# Patient Record
Sex: FEMALE | Race: BLACK OR AFRICAN AMERICAN | Employment: FULL TIME | URBAN - METROPOLITAN AREA
[De-identification: names, ages, dates, MRNs, and addresses within clinical notes are randomized per-mention and may not be internally consistent; named-entity substitution may affect disease eponyms.]

---

## 2017-01-06 ENCOUNTER — TELEPHONE (OUTPATIENT)
Dept: RHEUMATOLOGY | Age: 47
End: 2017-01-06

## 2017-01-06 NOTE — TELEPHONE ENCOUNTER
Called pharmacy and informed them that the patient needs labs prior to refilling of Methotrexate. Also called patient and informed her that labs were need before Methotrexate could be filled. The patient will be getting labs at her employer and will have the results faxed to Dr. Emerita Ch. The patient will also call on 1/9/17 to schedule f/u appt.

## 2017-01-16 RX ORDER — LEVONORGESTREL AND ETHINYL ESTRADIOL 0.15-0.03
KIT ORAL
Qty: 91 TAB | Refills: 2 | Status: ON HOLD | OUTPATIENT
Start: 2017-01-16 | End: 2021-06-11

## 2017-01-30 DIAGNOSIS — Z79.60 LONG-TERM USE OF IMMUNOSUPPRESSANT MEDICATION: ICD-10-CM

## 2017-01-30 DIAGNOSIS — M05.79 SEROPOSITIVE RHEUMATOID ARTHRITIS OF MULTIPLE SITES (HCC): Primary | ICD-10-CM

## 2017-02-07 ENCOUNTER — TELEPHONE (OUTPATIENT)
Dept: RHEUMATOLOGY | Age: 47
End: 2017-02-07

## 2017-03-02 ENCOUNTER — OFFICE VISIT (OUTPATIENT)
Dept: RHEUMATOLOGY | Age: 47
End: 2017-03-02

## 2017-03-02 VITALS
SYSTOLIC BLOOD PRESSURE: 126 MMHG | RESPIRATION RATE: 16 BRPM | HEART RATE: 106 BPM | WEIGHT: 293 LBS | TEMPERATURE: 97.1 F | DIASTOLIC BLOOD PRESSURE: 81 MMHG | BODY MASS INDEX: 50.02 KG/M2 | HEIGHT: 64 IN | OXYGEN SATURATION: 96 %

## 2017-03-02 DIAGNOSIS — R11.11 NON-INTRACTABLE VOMITING WITHOUT NAUSEA, UNSPECIFIED VOMITING TYPE: ICD-10-CM

## 2017-03-02 DIAGNOSIS — Z79.60 LONG-TERM USE OF IMMUNOSUPPRESSANT MEDICATION: ICD-10-CM

## 2017-03-02 DIAGNOSIS — M05.79 SEROPOSITIVE RHEUMATOID ARTHRITIS OF MULTIPLE SITES (HCC): Primary | ICD-10-CM

## 2017-03-02 RX ORDER — METHOTREXATE 2.5 MG/1
TABLET ORAL
Qty: 104 TAB | Refills: 0 | Status: SHIPPED | OUTPATIENT
Start: 2017-03-02 | End: 2017-03-03 | Stop reason: SDUPTHER

## 2017-03-02 RX ORDER — FOLIC ACID 1 MG/1
TABLET ORAL
Qty: 170 TAB | Refills: 3 | Status: SHIPPED | OUTPATIENT
Start: 2017-03-02 | End: 2017-03-03 | Stop reason: SDUPTHER

## 2017-03-02 RX ORDER — PREDNISONE 5 MG/1
TABLET ORAL
Qty: 60 TAB | Refills: 0 | Status: SHIPPED | OUTPATIENT
Start: 2017-03-02 | End: 2017-03-03 | Stop reason: SDUPTHER

## 2017-03-02 RX ORDER — HYDROXYCHLOROQUINE SULFATE 200 MG/1
200 TABLET, FILM COATED ORAL 2 TIMES DAILY
Qty: 180 TAB | Refills: 0 | Status: SHIPPED | OUTPATIENT
Start: 2017-03-02 | End: 2017-03-03 | Stop reason: SDUPTHER

## 2017-03-02 NOTE — PATIENT INSTRUCTIONS
Have labs again in 6-8 weeks to follow up on the methotrexate. Take prednisone 5 mg tablets starting at 2 pills each morning with breakfast and lower by 1/2 tablet each week:  2 pills x 1 week,  Then 1 and 1/2 pills x 1 week,  Then 1 pill x 1 week,  Then 1/2 pill x 1 week, then stop (there will be leftover pills in the bottle)    Have Plaquenil eye exam with your eye doctor today. Review the vomiting with your PCP.

## 2017-03-02 NOTE — PROGRESS NOTES
HISTORY OF PRESENT ILLNESS  Brennan Ahumada is a 40 y.o. female. HPI  She presents for follow up of seropositive rheumatoid arthritis. She was last seen 12/2015. \"I've been doing good\". She had been taking methotrexate 20 mg qwk and Plaquenil 200 mg bid; she ran out of these medicines 1 month ago. She states that since she ran out of her meds, her hands have become stiff, increased over the last week. Morning joint stiffness x 3 hours. She has some swelling in the hands in the mornings now. She has some slight pain in bilateral toes. She notes increased gelling after rest in her knees. No sicca sxs. No rash. No fevers. No Raynaud's sxs. No major illness, hospitalization, or procedures since last visit. No major flares since last visit (until now) and has not had any steroids since last visit. 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 Due to her work schedule, she does not have an exercise routine. She has not seen her PCP in over a year, to follow her diabetes (HgbA1C 6.2 last checked 12/2015), HTN, and vitamin D deficiency. She takes vitamin D 5000 units daily. She will schedule soon with PCP. She has appt today with eye doctor for diabetic and plaquenil eye exam. She rarely takes aleve at bedtime, but has needed it twice in the last week. Review of Systems   Constitutional: Negative for fever and weight loss. HENT: Negative for sore throat. Eyes: Negative for blurred vision. Respiratory: Negative for cough and shortness of breath. Cardiovascular: Negative for leg swelling. Gastrointestinal: Positive for nausea and vomiting. Negative for abdominal pain, blood in stool and melena. She has noted a few episodes of vomiting over the last  2 months, it wakes her up at night. No abdominal pain, no nausea.  She wasn't sure if related to methotrexate at first, however she had a couple episodes in the last month while off of methotrexate, so she feels it is not related. She will f/u with PCP about this. Musculoskeletal: Negative for falls. Skin: Negative for rash. Neurological: Negative for headaches. Endo/Heme/Allergies: Negative for polydipsia. Does not bruise/bleed easily. Allergies   Allergen Reactions    Other Food Hives and Shortness of Breath     Peanuts    Nuts [Tree Nut] Hives and Shortness of Breath    Aspirin Hives    Darvocet A500 [Propoxyphene N-Acetaminophen] Other (comments)     migraine    Imitrex [Sumatriptan Succinate] Palpitations    Sulfa (Sulfonamide Antibiotics) Hives       Current Outpatient Prescriptions   Medication Sig Dispense Refill    levonorgestrel-ethinyl estradiol (SEASONALE) 0.15 mg-30 mcg 3MPk TAKE 1 TABLET BY MOUTH EVERY DAY 91 Tab 2    hydroxychloroquine (PLAQUENIL) 200 mg tablet Take 1 Tab by mouth two (2) times a day. 180 Tab 0    lisinopril (PRINIVIL, ZESTRIL) 20 mg tablet TAKE 1 TABLET BY MOUTH EVERY DAY 30 Tab 5    linagliptin (TRADJENTA) 5 mg tablet Take 1 Tab by mouth daily. 90 Tab 3    metFORMIN (GLUCOPHAGE) 500 mg tablet TAKE 2 TABLETS BY MOUTH TWICE A DAY WITH MEALS 360 Tab 3    lisinopril (PRINIVIL, ZESTRIL) 20 mg tablet TAKE 1 TABLET BY MOUTH EVERY DAY 90 Tab 3    buPROPion XL (WELLBUTRIN XL) 300 mg XL tablet Take 1 Tab by mouth every morning. 90 Tab 3    oxybutynin chloride XL (DITROPAN XL) 10 mg CR tablet Take 1 Tab by mouth daily. 90 Tab 3    folic acid (FOLVITE) 1 mg tablet 2 mg daily on Tuesdays through Fridays; 3 mg daily on Saturdays through Mondays 170 Tab 3    methotrexate (RHEUMATREX) 2.5 mg tablet 20 mg (8 tablets) once weekly. 104 Tab 1    ondansetron (ZOFRAN ODT) 4 mg disintegrating tablet PLACE 1 TABLET ON TONGUE & LET DISSOLVE EVERY 8 HOURS AS NEEDED FOR NAUSEA 20 Tab 2    naproxen sodium (ALEVE) 220 mg tablet Take 660 mg by mouth two (2) times daily (with meals).  CHOLECALCIFEROL, VITAMIN D3, PO Take 5,000 Units by mouth daily.       EPIPEN 2-GRAYSON 0.3 mg/0.3 mL injection INJECT . 3ML BY INTRAMUSCULAR ROUTE ONCE AS NEEDED FOR UP TO 1 DOSE 2 mL 1    TURMERIC, BULK, Take  by mouth. Past medical, surgical, and family hx reviewed. Vitals:    03/02/17 0914   BP: 126/81   Pulse: (!) 106   Resp: 16   Temp: 97.1 °F (36.2 °C)   TempSrc: Oral   SpO2: 96%   Weight: 294 lb (133.4 kg)   Height: 5' 4\" (1.626 m)   PainSc:   6   PainLoc: Hand   LMP: 12/02/2016       Physical Exam   Constitutional: She is oriented to person, place, and time. She appears well-developed and well-nourished. HENT:   Mouth/Throat: Oropharynx is clear and moist.   Eyes: Conjunctivae are normal. Pupils are equal, round, and reactive to light. Neck: Neck supple. Cardiovascular: Regular rhythm and normal heart sounds. Tachycardia present. Pulmonary/Chest: Effort normal and breath sounds normal. She has no wheezes. Abdominal: Soft. Bowel sounds are normal. There is no tenderness. Musculoskeletal:   -synovitis: right hand MCP 2,3 and PIP 2-4; left hand MCP 2,3; both feet MTP 1  -comfortable FROM of shoulders and knees   Lymphadenopathy:     She has no cervical adenopathy. Neurological: She is alert and oriented to person, place, and time. Skin: Skin is warm and dry. No rash noted. Psychiatric: She has a normal mood and affect. Thought content normal.   Vitals reviewed.       Lab Results   Component Value Date/Time    Sed rate (ESR) 29 12/10/2015 09:06 AM     Lab Results   Component Value Date/Time    WBC 9.1 12/10/2015 09:06 AM    HGB 12.8 12/10/2015 09:06 AM    HCT 39.6 12/10/2015 09:06 AM    PLATELET 166 64/90/8811 09:06 AM    MCV 90 12/10/2015 09:06 AM     Lab Results   Component Value Date/Time    Sodium 137 12/10/2015 09:06 AM    Potassium 4.2 12/10/2015 09:06 AM    Chloride 99 12/10/2015 09:06 AM    CO2 22 12/10/2015 09:06 AM    Anion gap 8 03/10/2009 08:22 AM    Glucose 121 12/10/2015 09:06 AM    BUN 9 12/10/2015 09:06 AM    Creatinine 0.67 12/10/2015 09:06 AM    BUN/Creatinine ratio 13 12/10/2015 09:06 AM    GFR est  12/10/2015 09:06 AM    GFR est non- 12/10/2015 09:06 AM    Calcium 9.3 12/10/2015 09:06 AM    Bilirubin, total 0.2 12/10/2015 09:06 AM    AST (SGOT) 14 12/10/2015 09:06 AM    Alk. phosphatase 52 12/10/2015 09:06 AM    Protein, total 7.0 12/10/2015 09:06 AM    Albumin 4.2 12/10/2015 09:06 AM    Globulin 3.7 03/10/2009 08:22 AM    A-G Ratio 1.5 12/10/2015 09:06 AM    ALT (SGPT) 18 12/10/2015 09:06 AM     CDAI 3/2/2017   Swollen Joint Count 7   Tender Joint Count 7   Physician Assessment (0-10) 2   Patient Assessment (0-10) 2   CDAI 18         ASSESSMENT and PLAN    ICD-10-CM ICD-9-CM    1. Seropositive rheumatoid arthritis of multiple sites ([facility] [address] -  Strongly seropositive (RF and CCP) symmetric polyarthritis. She has overall been doing well with methotrexate 20 mg weekly and Plaquenil 200 mg BID. Now off of her regimen x 1 month she has mild synovitis in hands and feet today. M05.79 714.0 SED RATE (ESR)    Resume therapy with:  Plaquenil 200 mg bid  methotrexate 20 mg qwk, with folic acid 3 mg on Sat-Mon and 2 mg the other days          C REACTIVE PROTEIN, QT        hydroxychloroquine (PLAQUENIL) 200 mg tablet      folic acid (FOLVITE) 1 mg tablet      methotrexate (RHEUMATREX) 2.5 mg tablet        predniSONE (DELTASONE) 5 mg tablet - 10 mg taper by 2.5 mg qwk     2. Long-term use of immunosuppressant medication Z79.899 V58.69 CBC WITH AUTOMATED DIFF      METABOLIC PANEL, COMPREHENSIVE    Plaquenil eye exam     3. Non-intractable vomiting without nausea, unspecified vomiting type - intermittent vomiting over the last 2 months, wakes her up at night, no pain or nausea, does not correlate with methotrexate dosing  R11.11 787.03 Follow up with PCP for further evaluation       Follow-up Disposition:  Return in about 4 months (around 7/2/2017).      I have reviewed and discussed all findings with Dr. Hermine Dakin, who also examined the patient, and he agrees with the assessment and plan. ADDENDUM: I have seen and examined the patient. I have discussed the relevant findings with Ms. Zhong and concur with the assessment and plan. Seropositive RA on methotrexate 20 mg weekly and Plaquenil 200 mg BID. Off past month (no visit in over one year). Had been doing well with recent flare. Labs today. Restart medications. Eye doctor soon. Prednisone taper low dose as directed over 4 weeks. Unclear etiology of sporadic night time vomiting. She will review with Dr. Megan Manning.  F/U as noted,  Cassandra Quintanilla MD

## 2017-03-02 NOTE — MR AVS SNAPSHOT
Visit Information Date & Time Provider Department Dept. Phone Encounter #  
 3/2/2017  9:00 AM Chan Rowell MD [facility] [id number] Follow-up Instructions Return in about 4 months (around 7/2/2017). Upcoming Health Maintenance Date Due  
 EYE EXAM RETINAL OR DILATED Q1 11/16/1981 Pneumococcal 19-64 Medium Risk (1 of 1 - PPSV23) 11/16/1990 HEMOGLOBIN A1C Q6M 6/10/2016 INFLUENZA AGE 9 TO ADULT 8/1/2016 FOOT EXAM Q1 12/10/2016 MICROALBUMIN Q1 12/10/2016 LIPID PANEL Q1 12/10/2016 PAP AKA CERVICAL CYTOLOGY 12/10/2018 DTaP/Tdap/Td series (2 - Td) 7/13/2025 Allergies as of 3/2/2017  Review Complete On: 3/2/2017 By: Siva Avina PA-C Severity Noted Reaction Type Reactions Other Food High 03/03/2015    Hives, Shortness of Breath Peanuts Nuts [Tree Nut] High 03/03/2015    Hives, Shortness of Breath Aspirin  10/27/2014    Hives Darvocet A500 [Propoxyphene N-acetaminophen]  10/27/2014    Other (comments)  
 migraine Imitrex [Sumatriptan Succinate]  10/27/2014    Palpitations Sulfa (Sulfonamide Antibiotics)  10/27/2014    Hives Current Immunizations  Reviewed on 3/2/2017 Name Date Influenza Vaccine 9/1/2016, 10/5/2015 Tdap 7/13/2015 Reviewed by Danielle Russell LPN on 8/2/3986 at  8:34 AM  
 Reviewed by Siva Avina PA-C on 3/2/2017 at  9:28 AM  
You Were Diagnosed With   
  
 Codes Comments Seropositive rheumatoid arthritis of multiple sites [facility])    -  Primary ICD-10-CM: M05.79 ICD-9-CM: 714.0 Long-term use of immunosuppressant medication     ICD-10-CM: Z79.899 ICD-9-CM: V58.69 Non-intractable vomiting without nausea, unspecified vomiting type     ICD-10-CM: R11.11 ICD-9-CM: 787.03 Vitals BP  
  
  
  
  
  
 126/81 (BP 1 Location: Right arm, BP Patient Position: Sitting) Vitals History BMI and BSA Data Body Mass Index Body Surface Area 50.46 kg/m 2 2.45 m 2 Preferred Pharmacy Pharmacy Name Phone [facility]ACY #[id number]- [place], [address] [phone] Your Updated Medication List  
  
   
This list is accurate as of: 3/2/17 10:07 AM.  Always use your most recent med list.  
  
  
  
  
 ALEVE 220 mg tablet Generic drug:  naproxen sodium Take 660 mg by mouth two (2) times daily (with meals). buPROPion  mg XL tablet Commonly known as:  Scheryl Churches Take 1 Tab by mouth every morning. CHOLECALCIFEROL (VITAMIN D3) PO Take 5,000 Units by mouth daily. EPIPEN 2-GRAYSON 0.3 mg/0.3 mL injection Generic drug:  EPINEPHrine INJECT . 3ML BY INTRAMUSCULAR ROUTE ONCE AS NEEDED FOR UP TO 1 DOSE  
  
 folic acid 1 mg tablet Commonly known as:  FOLVITE  
2 mg daily on Tuesdays through Fridays; 3 mg daily on Saturdays through Mondays  
  
 hydroxychloroquine 200 mg tablet Commonly known as:  PLAQUENIL Take 1 Tab by mouth two (2) times a day. levonorgestrel-ethinyl estradiol 0.15 mg-30 mcg 3mpk Commonly known as:  SEASONALE  
TAKE 1 TABLET BY MOUTH EVERY DAY  
  
 linagliptin 5 mg tablet Commonly known as:  [place] Take 1 Tab by mouth daily. * lisinopril 20 mg tablet Commonly known as:  PRINIVIL, ZESTRIL  
TAKE 1 TABLET BY MOUTH EVERY DAY  
  
 * lisinopril 20 mg tablet Commonly known as:  PRINIVIL, ZESTRIL  
TAKE 1 TABLET BY MOUTH EVERY DAY  
  
 metFORMIN 500 mg tablet Commonly known as:  GLUCOPHAGE  
TAKE 2 TABLETS BY MOUTH TWICE A DAY WITH MEALS  
  
 methotrexate 2.5 mg tablet Commonly known as:  RHEUMATREX  
20 mg (8 tablets) once weekly. ondansetron 4 mg disintegrating tablet Commonly known as:  ZOFRAN ODT PLACE 1 TABLET ON TONGUE & LET DISSOLVE EVERY 8 HOURS AS NEEDED FOR NAUSEA  
  
 oxybutynin chloride XL 10 mg CR tablet Commonly known as:  DITROPAN XL  
 Take 1 Tab by mouth daily. predniSONE 5 mg tablet Commonly known as:  Berle Pouch Take 2 tabs po qam and lower as directed. TURMERIC (BULK) Take  by mouth. * Notice: This list has 2 medication(s) that are the same as other medications prescribed for you. Read the directions carefully, and ask your doctor or other care provider to review them with you. Prescriptions Sent to Pharmacy Refills  
 hydroxychloroquine (PLAQUENIL) 200 mg tablet 0 Sig: Take 1 Tab by mouth two (2) times a day. Class: Normal  
 Pharmacy: [facility]/pharmacy #[id number] Ph #: [phone] Route: Oral  
 folic acid (FOLVITE) 1 mg tablet 3 Si mg daily on  through ; 3 mg daily on  through  Class: Normal  
 Pharmacy: [facility]/pharmacy #[id number] Ph #: [phone]  
 methotrexate (RHEUMATREX) 2.5 mg tablet 0 Si mg (8 tablets) once weekly. Class: Normal  
 Pharmacy: [facility]/pharmacy #[id number] Ph #: [phone]  
 predniSONE (DELTASONE) 5 mg tablet 0 Sig: Take 2 tabs po qam and lower as directed. Class: Normal  
 [id number] [address] Ph #: [phone] We Performed the Following C REACTIVE PROTEIN, QT [60213 CPT(R)] CBC WITH AUTOMATED DIFF [82365 CPT(R)] METABOLIC PANEL, COMPREHENSIVE [18554 CPT(R)] SED RATE (ESR) [id number] CPT(R)] Follow-up Instructions Return in about 4 months (around 2017). Patient Instructions Have labs again in 6-8 weeks to follow up on the methotrexate. Take prednisone 5 mg tablets starting at 2 pills each morning with breakfast and lower by 1/2 tablet each week: 
2 pills x 1 week, Then 1 and 1/2 pills x 1 week, Then 1 pill x 1 week, 
 Then 1/2 pill x 1 week, then stop (there will be leftover pills in the bottle) Have Plaquenil eye exam with your eye doctor today. Review the vomiting with your PCP. Introducing [facility] & [facility]! Dear [facility]: Thank you for requesting a [employer] account. Our records indicate that you already have an active [employer] account. You can access your account anytime at [email or web address] Did you know that you can access your hospital and ER discharge instructions at any time in [employer]? You can also review all of your test results from your hospital stay or ER visit. Additional Information If you have questions, please visit the Frequently Asked Questions section of the [employer] website at [email or web address] Remember, [employer] is NOT to be used for urgent needs. For medical emergencies, dial 911. Now available from your iPhone and Android! Please provide this summary of care documentation to your next provider. Your primary care clinician is listed as Johnnie Simons. If you have any questions after today's visit, please call [phone].

## 2017-03-03 DIAGNOSIS — M05.79 SEROPOSITIVE RHEUMATOID ARTHRITIS OF MULTIPLE SITES (HCC): ICD-10-CM

## 2017-03-03 LAB
ALBUMIN SERPL-MCNC: 4.1 G/DL (ref 3.5–5.5)
ALBUMIN/GLOB SERPL: 1.4 {RATIO} (ref 1.1–2.5)
ALP SERPL-CCNC: 60 IU/L (ref 39–117)
ALT SERPL-CCNC: 21 IU/L (ref 0–32)
AST SERPL-CCNC: 16 IU/L (ref 0–40)
BASOPHILS # BLD AUTO: 0 X10E3/UL (ref 0–0.2)
BASOPHILS NFR BLD AUTO: 0 %
BILIRUB SERPL-MCNC: 0.2 MG/DL (ref 0–1.2)
BUN SERPL-MCNC: 10 MG/DL (ref 6–24)
BUN/CREAT SERPL: 13 (ref 9–23)
CALCIUM SERPL-MCNC: 9.1 MG/DL (ref 8.7–10.2)
CHLORIDE SERPL-SCNC: 98 MMOL/L (ref 96–106)
CO2 SERPL-SCNC: 22 MMOL/L (ref 18–29)
CREAT SERPL-MCNC: 0.76 MG/DL (ref 0.57–1)
CRP SERPL-MCNC: 17.3 MG/L (ref 0–4.9)
EOSINOPHIL # BLD AUTO: 0.1 X10E3/UL (ref 0–0.4)
EOSINOPHIL NFR BLD AUTO: 1 %
ERYTHROCYTE [DISTWIDTH] IN BLOOD BY AUTOMATED COUNT: 13.4 % (ref 12.3–15.4)
ERYTHROCYTE [SEDIMENTATION RATE] IN BLOOD BY WESTERGREN METHOD: 22 MM/HR (ref 0–32)
GLOBULIN SER CALC-MCNC: 3 G/DL (ref 1.5–4.5)
GLUCOSE SERPL-MCNC: 226 MG/DL (ref 65–99)
HCT VFR BLD AUTO: 41.7 % (ref 34–46.6)
HGB BLD-MCNC: 13.6 G/DL (ref 11.1–15.9)
IMM GRANULOCYTES # BLD: 0 X10E3/UL (ref 0–0.1)
IMM GRANULOCYTES NFR BLD: 0 %
LYMPHOCYTES # BLD AUTO: 2.1 X10E3/UL (ref 0.7–3.1)
LYMPHOCYTES NFR BLD AUTO: 24 %
MCH RBC QN AUTO: 29.5 PG (ref 26.6–33)
MCHC RBC AUTO-ENTMCNC: 32.6 G/DL (ref 31.5–35.7)
MCV RBC AUTO: 91 FL (ref 79–97)
MONOCYTES # BLD AUTO: 0.7 X10E3/UL (ref 0.1–0.9)
MONOCYTES NFR BLD AUTO: 7 %
NEUTROPHILS # BLD AUTO: 6.2 X10E3/UL (ref 1.4–7)
NEUTROPHILS NFR BLD AUTO: 68 %
PLATELET # BLD AUTO: 319 X10E3/UL (ref 150–379)
POTASSIUM SERPL-SCNC: 4.6 MMOL/L (ref 3.5–5.2)
PROT SERPL-MCNC: 7.1 G/DL (ref 6–8.5)
RBC # BLD AUTO: 4.61 X10E6/UL (ref 3.77–5.28)
SODIUM SERPL-SCNC: 139 MMOL/L (ref 134–144)
WBC # BLD AUTO: 9.1 X10E3/UL (ref 3.4–10.8)

## 2017-03-03 RX ORDER — HYDROXYCHLOROQUINE SULFATE 200 MG/1
200 TABLET, FILM COATED ORAL 2 TIMES DAILY
Qty: 180 TAB | Refills: 0 | Status: SHIPPED | OUTPATIENT
Start: 2017-03-03 | End: 2017-10-16 | Stop reason: SDUPTHER

## 2017-03-03 RX ORDER — METHOTREXATE 2.5 MG/1
TABLET ORAL
Qty: 104 TAB | Refills: 0 | Status: SHIPPED | OUTPATIENT
Start: 2017-03-03 | End: 2017-11-21 | Stop reason: SDUPTHER

## 2017-03-03 RX ORDER — PREDNISONE 5 MG/1
TABLET ORAL
Qty: 60 TAB | Refills: 0 | Status: SHIPPED | OUTPATIENT
Start: 2017-03-03 | End: 2017-04-20 | Stop reason: SDUPTHER

## 2017-03-03 RX ORDER — FOLIC ACID 1 MG/1
TABLET ORAL
Qty: 170 TAB | Refills: 3 | Status: SHIPPED | OUTPATIENT
Start: 2017-03-03 | End: 2017-11-21 | Stop reason: SDUPTHER

## 2017-03-16 NOTE — PROGRESS NOTES
She is informed of lab results per Dr. Candiss Merlin note and understands. She does not check blood sugars at home but is following up with her doctor.

## 2017-04-20 DIAGNOSIS — M05.79 SEROPOSITIVE RHEUMATOID ARTHRITIS OF MULTIPLE SITES (HCC): ICD-10-CM

## 2017-04-20 RX ORDER — PREDNISONE 5 MG/1
TABLET ORAL
Qty: 45 TAB | Refills: 0 | Status: SHIPPED | OUTPATIENT
Start: 2017-04-20 | End: 2017-08-15 | Stop reason: ALTCHOICE

## 2017-06-12 RX ORDER — LISINOPRIL 20 MG/1
TABLET ORAL
Qty: 90 TAB | Refills: 2 | Status: SHIPPED | OUTPATIENT
Start: 2017-06-12 | End: 2017-08-15 | Stop reason: SDUPTHER

## 2017-08-15 ENCOUNTER — OFFICE VISIT (OUTPATIENT)
Dept: FAMILY MEDICINE CLINIC | Age: 47
End: 2017-08-15

## 2017-08-15 VITALS
HEIGHT: 64 IN | TEMPERATURE: 98 F | DIASTOLIC BLOOD PRESSURE: 86 MMHG | OXYGEN SATURATION: 97 % | WEIGHT: 293 LBS | BODY MASS INDEX: 50.02 KG/M2 | HEART RATE: 95 BPM | RESPIRATION RATE: 20 BRPM | SYSTOLIC BLOOD PRESSURE: 119 MMHG

## 2017-08-15 DIAGNOSIS — Z00.00 ROUTINE GENERAL MEDICAL EXAMINATION AT A HEALTH CARE FACILITY: Primary | ICD-10-CM

## 2017-08-15 DIAGNOSIS — N32.81 OAB (OVERACTIVE BLADDER): ICD-10-CM

## 2017-08-15 DIAGNOSIS — I10 ESSENTIAL HYPERTENSION: ICD-10-CM

## 2017-08-15 DIAGNOSIS — M05.79 SEROPOSITIVE RHEUMATOID ARTHRITIS OF MULTIPLE SITES (HCC): ICD-10-CM

## 2017-08-15 DIAGNOSIS — E11.9 TYPE 2 DIABETES MELLITUS WITHOUT COMPLICATION, WITHOUT LONG-TERM CURRENT USE OF INSULIN (HCC): ICD-10-CM

## 2017-08-15 RX ORDER — BUPROPION HYDROCHLORIDE 300 MG/1
300 TABLET ORAL
Qty: 90 TAB | Refills: 3 | Status: SHIPPED | OUTPATIENT
Start: 2017-08-15 | End: 2018-07-30 | Stop reason: SDUPTHER

## 2017-08-15 RX ORDER — METFORMIN HYDROCHLORIDE 500 MG/1
TABLET ORAL
Qty: 360 TAB | Refills: 3 | Status: SHIPPED | OUTPATIENT
Start: 2017-08-15 | End: 2018-07-30 | Stop reason: SDUPTHER

## 2017-08-15 RX ORDER — ONDANSETRON 4 MG/1
TABLET, ORALLY DISINTEGRATING ORAL
Qty: 20 TAB | Refills: 2 | Status: SHIPPED | OUTPATIENT
Start: 2017-08-15 | End: 2018-05-01 | Stop reason: SDUPTHER

## 2017-08-15 RX ORDER — LISINOPRIL 20 MG/1
TABLET ORAL
Qty: 90 TAB | Refills: 3 | Status: SHIPPED | OUTPATIENT
Start: 2017-08-15 | End: 2018-10-12 | Stop reason: SDUPTHER

## 2017-08-15 RX ORDER — EPINEPHRINE 0.3 MG/.3ML
INJECTION SUBCUTANEOUS
Qty: 2 ML | Refills: 1 | Status: SHIPPED | OUTPATIENT
Start: 2017-08-15 | End: 2018-04-12 | Stop reason: SDUPTHER

## 2017-08-15 RX ORDER — OXYBUTYNIN CHLORIDE 10 MG/1
10 TABLET, EXTENDED RELEASE ORAL DAILY
Qty: 90 TAB | Refills: 3 | Status: SHIPPED | OUTPATIENT
Start: 2017-08-15 | End: 2018-07-30 | Stop reason: SDUPTHER

## 2017-08-15 NOTE — MR AVS SNAPSHOT
Visit Information Date & Time Provider Department Dept. Phone Encounter #  
 8/15/2017  8:00 AM Ayo Correia MD [address] [facility] [phone] [id number] Upcoming Health Maintenance Date Due HEMOGLOBIN A1C Q6M 6/10/2016 FOOT EXAM Q1 12/10/2016 MICROALBUMIN Q1 12/10/2016 LIPID PANEL Q1 12/10/2016 INFLUENZA AGE 9 TO ADULT 8/1/2017 EYE EXAM RETINAL OR DILATED Q1 3/2/2018 PAP AKA CERVICAL CYTOLOGY 12/10/2018 DTaP/Tdap/Td series (2 - Td) 7/13/2025 Allergies as of 8/15/2017  Review Complete On: 8/15/2017 By: Ayo Correia MD  
  
 Severity Noted Reaction Type Reactions Other Food High 03/03/2015    Hives, Shortness of Breath Peanuts Nuts [Tree Nut] High 03/03/2015    Hives, Shortness of Breath Aspirin  10/27/2014    Hives Darvocet A500 [Propoxyphene N-acetaminophen]  10/27/2014    Other (comments)  
 migraine Imitrex [Sumatriptan Succinate]  10/27/2014    Palpitations Sulfa (Sulfonamide Antibiotics)  10/27/2014    Hives Current Immunizations  Reviewed on 3/2/2017 Name Date Influenza Vaccine 9/1/2016, 10/5/2015 Tdap 7/13/2015 Not reviewed this visit You Were Diagnosed With   
  
 Codes Comments Routine general medical examination at a health care facility    -  Primary ICD-10-CM: Z00.00 ICD-9-CM: V70.0 Type 2 diabetes mellitus without complication, without long-term current use of insulin (HCC)     ICD-10-CM: E11.9 ICD-9-CM: 250.00 Essential hypertension     ICD-10-CM: I10 
ICD-9-CM: 401.9 Seropositive rheumatoid arthritis of multiple sites [facility])     ICD-10-CM: M05.79 ICD-9-CM: 714.0   
 OAB (overactive bladder)     ICD-10-CM: N32.81 ICD-9-CM: 596.51 Vitals BP Pulse Temp Resp Height(growth percentile) Weight(growth percentile) 119/86 (BP 1 Location: Left arm, BP Patient Position: Sitting) 95 98 °F (36.7 °C) (Oral) 20 5' 4\" (1.626 m) 304 lb 4.8 oz (138 kg) SpO2 BMI OB Status Smoking Status 97% 52.23 kg/m2 Unknown Never Smoker Vitals History BMI and BSA Data Body Mass Index Body Surface Area  
 52.23 kg/m 2 2.5 m 2 Preferred Pharmacy Pharmacy Name Phone [id number] [address], [address] [place] [address] [phone] Your Updated Medication List  
  
   
This list is accurate as of: 8/15/17  8:58 AM.  Always use your most recent med list.  
  
  
  
  
 ALEVE 220 mg tablet Generic drug:  naproxen sodium Take 660 mg by mouth two (2) times daily (with meals). buPROPion  mg XL tablet Commonly known as:  [place] Sandra Take 1 Tab by mouth every morning. CHOLECALCIFEROL (VITAMIN D3) PO Take 5,000 Units by mouth daily. EPINEPHrine 0.3 mg/0.3 mL injection Commonly known as:  EPIPEN 2-GRAYSON INJECT . 3ML BY INTRAMUSCULAR ROUTE ONCE AS NEEDED FOR UP TO 1 DOSE  
  
 folic acid 1 mg tablet Commonly known as:  FOLVITE  
2 mg daily on Tuesdays through Fridays; 3 mg daily on Saturdays through Mondays  
  
 hydroxychloroquine 200 mg tablet Commonly known as:  PLAQUENIL Take 1 Tab by mouth two (2) times a day. levonorgestrel-ethinyl estradiol 0.15 mg-30 mcg 3mpk Commonly known as:  SEASONALE  
TAKE 1 TABLET BY MOUTH EVERY DAY  
  
 linagliptin 5 mg tablet Commonly known as:  Vista Rom Take 1 Tab by mouth daily. lisinopril 20 mg tablet Commonly known as:  PRINIVIL, ZESTRIL  
TAKE 1 TABLET BY MOUTH EVERY DAY  
  
 metFORMIN 500 mg tablet Commonly known as:  GLUCOPHAGE  
TAKE 2 TABLETS BY MOUTH TWICE A DAY WITH MEALS  
  
 methotrexate 2.5 mg tablet Commonly known as:  RHEUMATREX  
20 mg (8 tablets) once weekly. ondansetron 4 mg disintegrating tablet Commonly known as:  ZOFRAN ODT PLACE 1 TABLET ON TONGUE & LET DISSOLVE EVERY 8 HOURS AS NEEDED FOR NAUSEA  
  
 oxybutynin chloride XL 10 mg CR tablet Commonly known as:  DITROPAN XL  
 Take 1 Tab by mouth daily. Prescriptions Sent to Pharmacy Refills  
 linagliptin (TRADJENTA) 5 mg tablet 3 Sig: Take 1 Tab by mouth daily. Class: Normal  
 Pharmacy: [facility]/pharmacy #[id number] Ph #: [phone] Route: Oral  
 metFORMIN (GLUCOPHAGE) 500 mg tablet 3 Sig: TAKE 2 TABLETS BY MOUTH TWICE A DAY WITH MEALS Class: Normal  
 Pharmacy: [facility]/pharmacy #[id number] Ph #: [phone] EPINEPHrine (EPIPEN 2-GRAYSON) 0.3 mg/0.3 mL injection 1 Sig: INJECT . 3ML BY INTRAMUSCULAR ROUTE ONCE AS NEEDED FOR UP TO 1 DOSE Class: Normal  
 Pharmacy: [facility]/pharmacy #[id number] Ph #: [phone]  
 lisinopril (PRINIVIL, ZESTRIL) 20 mg tablet 3 Sig: TAKE 1 TABLET BY MOUTH EVERY DAY Class: Normal  
 Pharmacy: [facility]/pharmacy #[id number] Ph #: [phone]  
 buPROPion XL (WELLBUTRIN XL) 300 mg XL tablet 3 Sig: Take 1 Tab by mouth every morning. Class: Normal  
 Pharmacy: [facility]/pharmacy #[id number] Ph #: [phone] Route: Oral  
 oxybutynin chloride XL (DITROPAN XL) 10 mg CR tablet 3 Sig: Take 1 Tab by mouth daily. Class: Normal  
 Pharmacy: [facility]/pharmacy #[id number] Ph #: [phone] Route: Oral  
 ondansetron (ZOFRAN ODT) 4 mg disintegrating tablet 2 Sig: PLACE 1 TABLET ON TONGUE & LET DISSOLVE EVERY 8 HOURS AS NEEDED FOR NAUSEA Class: Normal  
 Pharmacy: [facility]/pharmacy #[id number] Ph #: [phone] We Performed the Following C REACTIVE PROTEIN, QT [88218 CPT(R)] CBC WITH AUTOMATED DIFF [88389 CPT(R)] HEMOGLOBIN A1C WITH EAG [54676 CPT(R)]  DIABETES FOOT EXAM [HM7 Custom] LIPID PANEL [18630 CPT(R)] METABOLIC PANEL, COMPREHENSIVE [58690 CPT(R)] MICROALBUMIN, UR, RAND W/ MICROALBUMIN/CREA RATIO [id number] CPT(R)] SED RATE (ESR) [id number] CPT(R)] TSH 3RD GENERATION [09582 CPT(R)] VITAMIN D, 25 HYDROXY [id number] CPT(R)] Introducing [facility] & HEALTH SERVICES! Dear Mora Burden: Thank you for requesting a [employer] account. Our records indicate that you already have an active [employer] account. You can access your account anytime at [email or web address] Did you know that you can access your hospital and ER discharge instructions at any time in [employer]? You can also review all of your test results from your hospital stay or ER visit. Additional Information If you have questions, please visit the Frequently Asked Questions section of the [employer] website at [email or web address] Remember, [employer] is NOT to be used for urgent needs. For medical emergencies, dial 911. Now available from your iPhone and Android! Please provide this summary of care documentation to your next provider. Your primary care clinician is listed as Johnnie Simons. If you have any questions after today's visit, please call [phone].

## 2017-08-15 NOTE — PROGRESS NOTES
Chief Complaint   Patient presents with    Complete Physical    Labs     fasting       Patient seen in the office today for a complete physical and fasting labs

## 2017-08-16 LAB
25(OH)D3+25(OH)D2 SERPL-MCNC: 22 NG/ML (ref 30–100)
ALBUMIN SERPL-MCNC: 3.9 G/DL (ref 3.5–5.5)
ALBUMIN/CREAT UR: 4.6 MG/G CREAT (ref 0–30)
ALBUMIN/GLOB SERPL: 1.3 {RATIO} (ref 1.2–2.2)
ALP SERPL-CCNC: 65 IU/L (ref 39–117)
ALT SERPL-CCNC: 68 IU/L (ref 0–32)
AST SERPL-CCNC: 56 IU/L (ref 0–40)
BASOPHILS # BLD AUTO: 0 X10E3/UL (ref 0–0.2)
BASOPHILS NFR BLD AUTO: 0 %
BILIRUB SERPL-MCNC: 0.3 MG/DL (ref 0–1.2)
BUN SERPL-MCNC: 12 MG/DL (ref 6–24)
BUN/CREAT SERPL: 19 (ref 9–23)
CALCIUM SERPL-MCNC: 8.9 MG/DL (ref 8.7–10.2)
CHLORIDE SERPL-SCNC: 100 MMOL/L (ref 96–106)
CHOLEST SERPL-MCNC: 155 MG/DL (ref 100–199)
CO2 SERPL-SCNC: 20 MMOL/L (ref 18–29)
CREAT SERPL-MCNC: 0.62 MG/DL (ref 0.57–1)
CREAT UR-MCNC: 114 MG/DL
CRP SERPL-MCNC: 10.7 MG/L (ref 0–4.9)
EOSINOPHIL # BLD AUTO: 0.1 X10E3/UL (ref 0–0.4)
EOSINOPHIL NFR BLD AUTO: 1 %
ERYTHROCYTE [DISTWIDTH] IN BLOOD BY AUTOMATED COUNT: 14.3 % (ref 12.3–15.4)
ERYTHROCYTE [SEDIMENTATION RATE] IN BLOOD BY WESTERGREN METHOD: 25 MM/HR (ref 0–32)
EST. AVERAGE GLUCOSE BLD GHB EST-MCNC: 143 MG/DL
GLOBULIN SER CALC-MCNC: 2.9 G/DL (ref 1.5–4.5)
GLUCOSE SERPL-MCNC: 133 MG/DL (ref 65–99)
HBA1C MFR BLD: 6.6 % (ref 4.8–5.6)
HCT VFR BLD AUTO: 39.3 % (ref 34–46.6)
HDLC SERPL-MCNC: 74 MG/DL
HGB BLD-MCNC: 13 G/DL (ref 11.1–15.9)
IMM GRANULOCYTES # BLD: 0 X10E3/UL (ref 0–0.1)
IMM GRANULOCYTES NFR BLD: 0 %
INTERPRETATION, 910389: NORMAL
LDLC SERPL CALC-MCNC: 65 MG/DL (ref 0–99)
LYMPHOCYTES # BLD AUTO: 1.9 X10E3/UL (ref 0.7–3.1)
LYMPHOCYTES NFR BLD AUTO: 24 %
Lab: NORMAL
MCH RBC QN AUTO: 30 PG (ref 26.6–33)
MCHC RBC AUTO-ENTMCNC: 33.1 G/DL (ref 31.5–35.7)
MCV RBC AUTO: 91 FL (ref 79–97)
MICROALBUMIN UR-MCNC: 5.2 UG/ML
MONOCYTES # BLD AUTO: 0.6 X10E3/UL (ref 0.1–0.9)
MONOCYTES NFR BLD AUTO: 7 %
NEUTROPHILS # BLD AUTO: 5.3 X10E3/UL (ref 1.4–7)
NEUTROPHILS NFR BLD AUTO: 68 %
PLATELET # BLD AUTO: 295 X10E3/UL (ref 150–379)
POTASSIUM SERPL-SCNC: 4.4 MMOL/L (ref 3.5–5.2)
PROT SERPL-MCNC: 6.8 G/DL (ref 6–8.5)
RBC # BLD AUTO: 4.34 X10E6/UL (ref 3.77–5.28)
SODIUM SERPL-SCNC: 137 MMOL/L (ref 134–144)
TRIGL SERPL-MCNC: 82 MG/DL (ref 0–149)
TSH SERPL DL<=0.005 MIU/L-ACNC: 1.68 UIU/ML (ref 0.45–4.5)
VLDLC SERPL CALC-MCNC: 16 MG/DL (ref 5–40)
WBC # BLD AUTO: 7.8 X10E3/UL (ref 3.4–10.8)

## 2017-08-17 NOTE — PROGRESS NOTES
Slight elevation in liver enzymes, limit tylenol use and recheck in one month. Vitamin D is slightly low, please take a daily supplement of at least 2000 IU (international units) daily. Diabetes is well controlled  Inflammatory marker is improved. All other labs are within normal limits. A message has been sent in [employer] and the lab work released to the patient.

## 2017-08-29 DIAGNOSIS — M05.79 SEROPOSITIVE RHEUMATOID ARTHRITIS OF MULTIPLE SITES (HCC): ICD-10-CM

## 2017-08-29 RX ORDER — METHOTREXATE 2.5 MG/1
TABLET ORAL
Qty: 104 TAB | Refills: 0 | OUTPATIENT
Start: 2017-08-29

## 2017-08-29 NOTE — TELEPHONE ENCOUNTER
Recent liver enzymes elevated. Methotrexate declined until follow up (within month). Please do not take methotrexate and repeat liver panel 2 weeks off methotrexate.

## 2017-08-31 DIAGNOSIS — R74.8 ELEVATED LIVER ENZYMES: Primary | ICD-10-CM

## 2017-08-31 NOTE — TELEPHONE ENCOUNTER
Called and spoke to pt, informed Recent liver enzymes elevated. Methotrexate declined until follow up (within month). Please do not take methotrexate and repeat liver panel 2 weeks off methotrexate, per Dr. Malu Moran. Pt verbalized understanding. Orders placed for liver panel. Name given to  for scheduling.

## 2017-09-01 ENCOUNTER — TELEPHONE (OUTPATIENT)
Dept: RHEUMATOLOGY | Age: 47
End: 2017-09-01

## 2017-09-01 NOTE — TELEPHONE ENCOUNTER
Patient has not returned calls to the office. Left voice mail message to stop the Methotrexate due to elevated liver enzymes, and to have liver panel draw in 2 weeks after being off the Methotrexate per Dr. Carolyn Dotson note.

## 2017-09-14 ENCOUNTER — DOCUMENTATION ONLY (OUTPATIENT)
Dept: FAMILY MEDICINE CLINIC | Age: 47
End: 2017-09-14

## 2017-09-14 NOTE — PROGRESS NOTES
Notified pt per Whit Shrestha. She states her boss advised her she does not need a PPD placed as long as questionnaire is filled out. Copy of form placed in scan folder.

## 2017-09-14 NOTE — PROGRESS NOTES
Patient dropped off [facility] Annual Physical Exam and PPD form to be completed. She asked to be called when ready for  at .   Form placed in box at

## 2017-10-11 ENCOUNTER — TELEPHONE (OUTPATIENT)
Dept: RHEUMATOLOGY | Age: 47
End: 2017-10-11

## 2017-10-16 DIAGNOSIS — M05.79 SEROPOSITIVE RHEUMATOID ARTHRITIS OF MULTIPLE SITES (HCC): ICD-10-CM

## 2017-10-16 RX ORDER — HYDROXYCHLOROQUINE SULFATE 200 MG/1
200 TABLET, FILM COATED ORAL 2 TIMES DAILY
Qty: 180 TAB | Refills: 0 | Status: SHIPPED | OUTPATIENT
Start: 2017-10-16 | End: 2018-01-25 | Stop reason: SDUPTHER

## 2017-10-17 ENCOUNTER — TELEPHONE (OUTPATIENT)
Dept: RHEUMATOLOGY | Age: 47
End: 2017-10-17

## 2017-10-17 DIAGNOSIS — M05.9 SEROPOSITIVE RHEUMATOID ARTHRITIS (HCC): Primary | ICD-10-CM

## 2017-10-17 NOTE — TELEPHONE ENCOUNTER
CARLAA verified  Spoke with patient and informed her to call for appointment and that required lab work is being mailed today .

## 2017-11-15 DIAGNOSIS — M05.9 SEROPOSITIVE RHEUMATOID ARTHRITIS (HCC): ICD-10-CM

## 2017-11-16 ENCOUNTER — TELEPHONE (OUTPATIENT)
Dept: RHEUMATOLOGY | Age: 47
End: 2017-11-16

## 2017-11-16 NOTE — TELEPHONE ENCOUNTER
Labs were done on 10/24 at  [facility]. Please call [phone]/ROMINA wilson  She sent a fax on 5th and 13th  Nettie Montez Fax: [phone]) Ms Jo Patrick states she needs a signature.

## 2017-11-16 NOTE — TELEPHONE ENCOUNTER
Called patient advised per Dr Anand Boucher based on her most recent lab results it is okay for her to restart her MTX but she does need to follow up with a Rheumatologist.  Our next available new patient appointment with Dr Anand Boucher is April. Patient stated she will find another Rheumatologist in her network verbalized understanding.

## 2017-11-16 NOTE — TELEPHONE ENCOUNTER
Returned call to Ms Tej Cousins at [facility] [phone] no answer, LM/Vm to call office.

## 2017-11-21 DIAGNOSIS — M05.79 SEROPOSITIVE RHEUMATOID ARTHRITIS OF MULTIPLE SITES (HCC): ICD-10-CM

## 2017-11-21 RX ORDER — METHOTREXATE 2.5 MG/1
TABLET ORAL
Qty: 104 TAB | Refills: 0 | Status: SHIPPED | OUTPATIENT
Start: 2017-11-21 | End: 2018-01-26 | Stop reason: SDUPTHER

## 2017-11-21 RX ORDER — FOLIC ACID 1 MG/1
TABLET ORAL
Qty: 170 TAB | Refills: 3 | Status: SHIPPED | OUTPATIENT
Start: 2017-11-21 | End: 2017-12-26 | Stop reason: SDUPTHER

## 2017-12-26 DIAGNOSIS — M05.79 SEROPOSITIVE RHEUMATOID ARTHRITIS OF MULTIPLE SITES (HCC): ICD-10-CM

## 2017-12-26 RX ORDER — FOLIC ACID 1 MG/1
TABLET ORAL
Qty: 170 TAB | Refills: 3 | Status: SHIPPED | OUTPATIENT
Start: 2017-12-26 | End: 2018-10-12 | Stop reason: SDUPTHER

## 2018-01-25 DIAGNOSIS — M05.79 SEROPOSITIVE RHEUMATOID ARTHRITIS OF MULTIPLE SITES (HCC): ICD-10-CM

## 2018-01-25 RX ORDER — HYDROXYCHLOROQUINE SULFATE 200 MG/1
200 TABLET, FILM COATED ORAL 2 TIMES DAILY
Qty: 180 TAB | Refills: 0 | Status: SHIPPED | OUTPATIENT
Start: 2018-01-25 | End: 2018-05-01 | Stop reason: SDUPTHER

## 2018-01-26 ENCOUNTER — TELEPHONE (OUTPATIENT)
Dept: FAMILY MEDICINE CLINIC | Age: 48
End: 2018-01-26

## 2018-01-26 DIAGNOSIS — M05.79 SEROPOSITIVE RHEUMATOID ARTHRITIS OF MULTIPLE SITES (HCC): ICD-10-CM

## 2018-01-26 RX ORDER — METHOTREXATE 2.5 MG/1
TABLET ORAL
Qty: 104 TAB | Refills: 0 | Status: SHIPPED | OUTPATIENT
Start: 2018-01-26 | End: 2018-06-06 | Stop reason: SDUPTHER

## 2018-01-26 NOTE — TELEPHONE ENCOUNTER
----- Message from Jay Hannah sent at 1/25/2018 11:22 PM EST -----  Regarding: Prescription Question  Contact: [phone]  I am sorry. I just realized I also need the methotrexate. I appreciate your help.

## 2018-04-12 RX ORDER — EPINEPHRINE 0.3 MG/.3ML
INJECTION SUBCUTANEOUS
Qty: 2 SYRINGE | Refills: 1 | Status: SHIPPED | OUTPATIENT
Start: 2018-04-12 | End: 2018-09-06 | Stop reason: SDUPTHER

## 2018-05-01 DIAGNOSIS — M05.79 SEROPOSITIVE RHEUMATOID ARTHRITIS OF MULTIPLE SITES (HCC): ICD-10-CM

## 2018-05-02 RX ORDER — HYDROXYCHLOROQUINE SULFATE 200 MG/1
TABLET, FILM COATED ORAL
Qty: 180 TAB | Refills: 0 | Status: SHIPPED | OUTPATIENT
Start: 2018-05-02 | End: 2018-07-30 | Stop reason: SDUPTHER

## 2018-05-02 RX ORDER — ONDANSETRON 4 MG/1
TABLET, ORALLY DISINTEGRATING ORAL
Qty: 20 TAB | Refills: 2 | Status: SHIPPED | OUTPATIENT
Start: 2018-05-02 | End: 2019-03-23 | Stop reason: SDUPTHER

## 2018-06-06 DIAGNOSIS — M05.79 SEROPOSITIVE RHEUMATOID ARTHRITIS OF MULTIPLE SITES (HCC): ICD-10-CM

## 2018-06-06 RX ORDER — METHOTREXATE 2.5 MG/1
TABLET ORAL
Qty: 104 TAB | Refills: 0 | Status: SHIPPED | OUTPATIENT
Start: 2018-06-06 | End: 2018-09-06 | Stop reason: SDUPTHER

## 2018-07-30 DIAGNOSIS — N32.81 OAB (OVERACTIVE BLADDER): ICD-10-CM

## 2018-07-30 DIAGNOSIS — M05.79 SEROPOSITIVE RHEUMATOID ARTHRITIS OF MULTIPLE SITES (HCC): ICD-10-CM

## 2018-07-30 RX ORDER — HYDROXYCHLOROQUINE SULFATE 200 MG/1
TABLET, FILM COATED ORAL
Qty: 180 TAB | Refills: 0 | Status: SHIPPED | OUTPATIENT
Start: 2018-07-30 | End: 2018-10-12 | Stop reason: SDUPTHER

## 2018-07-31 RX ORDER — OXYBUTYNIN CHLORIDE 10 MG/1
TABLET, EXTENDED RELEASE ORAL
Qty: 90 TAB | Refills: 3 | Status: SHIPPED | OUTPATIENT
Start: 2018-07-31 | End: 2019-06-04 | Stop reason: SDUPTHER

## 2018-07-31 RX ORDER — LINAGLIPTIN 5 MG/1
TABLET, FILM COATED ORAL
Qty: 90 TAB | Refills: 3 | Status: SHIPPED | OUTPATIENT
Start: 2018-07-31 | End: 2019-03-12 | Stop reason: CLARIF

## 2018-07-31 RX ORDER — BUPROPION HYDROCHLORIDE 300 MG/1
TABLET ORAL
Qty: 90 TAB | Refills: 3 | Status: SHIPPED | OUTPATIENT
Start: 2018-07-31 | End: 2019-06-04 | Stop reason: SDUPTHER

## 2018-07-31 RX ORDER — METFORMIN HYDROCHLORIDE 500 MG/1
TABLET ORAL
Qty: 360 TAB | Refills: 3 | Status: SHIPPED | OUTPATIENT
Start: 2018-07-31 | End: 2019-06-04 | Stop reason: SDUPTHER

## 2018-09-06 DIAGNOSIS — M05.79 SEROPOSITIVE RHEUMATOID ARTHRITIS OF MULTIPLE SITES (HCC): ICD-10-CM

## 2018-09-07 RX ORDER — METHOTREXATE 2.5 MG/1
TABLET ORAL
Qty: 104 TAB | Refills: 0 | Status: SHIPPED | OUTPATIENT
Start: 2018-09-07 | End: 2018-12-28 | Stop reason: SDUPTHER

## 2018-09-07 RX ORDER — EPINEPHRINE 0.3 MG/.3ML
INJECTION SUBCUTANEOUS
Qty: 2 SYRINGE | Refills: 1 | Status: SHIPPED | OUTPATIENT
Start: 2018-09-07 | End: 2019-03-12 | Stop reason: SDUPTHER

## 2018-10-12 DIAGNOSIS — M05.79 SEROPOSITIVE RHEUMATOID ARTHRITIS OF MULTIPLE SITES (HCC): ICD-10-CM

## 2018-10-12 RX ORDER — HYDROXYCHLOROQUINE SULFATE 200 MG/1
TABLET, FILM COATED ORAL
Qty: 180 TAB | Refills: 0 | Status: SHIPPED | OUTPATIENT
Start: 2018-10-12 | End: 2018-12-28 | Stop reason: SDUPTHER

## 2018-10-12 RX ORDER — FOLIC ACID 1 MG/1
TABLET ORAL
Qty: 170 TAB | Refills: 3 | Status: SHIPPED | OUTPATIENT
Start: 2018-10-12 | End: 2018-11-06 | Stop reason: SDUPTHER

## 2018-10-12 RX ORDER — LISINOPRIL 20 MG/1
TABLET ORAL
Qty: 90 TAB | Refills: 1 | Status: SHIPPED | OUTPATIENT
Start: 2018-10-12 | End: 2019-03-12 | Stop reason: SDUPTHER

## 2018-11-06 ENCOUNTER — OFFICE VISIT (OUTPATIENT)
Dept: FAMILY MEDICINE CLINIC | Age: 48
End: 2018-11-06

## 2018-11-06 VITALS
SYSTOLIC BLOOD PRESSURE: 139 MMHG | HEIGHT: 64 IN | RESPIRATION RATE: 20 BRPM | WEIGHT: 293 LBS | TEMPERATURE: 98.4 F | DIASTOLIC BLOOD PRESSURE: 70 MMHG | OXYGEN SATURATION: 96 % | HEART RATE: 96 BPM | BODY MASS INDEX: 50.02 KG/M2

## 2018-11-06 DIAGNOSIS — M05.79 SEROPOSITIVE RHEUMATOID ARTHRITIS OF MULTIPLE SITES (HCC): ICD-10-CM

## 2018-11-06 DIAGNOSIS — E11.9 TYPE 2 DIABETES MELLITUS WITHOUT COMPLICATION, WITHOUT LONG-TERM CURRENT USE OF INSULIN (HCC): Primary | ICD-10-CM

## 2018-11-06 DIAGNOSIS — I10 ESSENTIAL HYPERTENSION: ICD-10-CM

## 2018-11-06 DIAGNOSIS — Z00.00 ROUTINE GENERAL MEDICAL EXAMINATION AT A HEALTH CARE FACILITY: ICD-10-CM

## 2018-11-06 DIAGNOSIS — E66.01 OBESITY, MORBID (HCC): ICD-10-CM

## 2018-11-06 DIAGNOSIS — F32.A MILD DEPRESSION: ICD-10-CM

## 2018-11-06 NOTE — PROGRESS NOTES
Chief Complaint   Patient presents with    Labs     Pt is fasting     Physical     Pt seen in the office today for a physical with fasting labs. Pt reports that she had a concussion a few months ago, happened on an x-ray machine, was out of work for 2 weeks. Pt also recently had bronchitis, 2 weeks ago, resolved per pt. Pt needs to find a new rheumatologist.     Subjective: (As above and below)     Chief Complaint   Patient presents with   Eino Elliott     Pt is fasting     Physical     she is a 56y.o. year old female who presents for evaluation. Reviewed PmHx, RxHx, FmHx, SocHx, AllgHx and updated in chart. Review of Systems - negative except as listed above    Objective:     Vitals:    11/06/18 1058   BP: 139/70   Pulse: 96   Resp: 20   Temp: 98.4 °F (36.9 °C)   TempSrc: Oral   SpO2: 96%   Weight: 311 lb 3.2 oz (141.2 kg)   Height: 5' 4\" (1.626 m)     Physical Examination: General appearance - alert, well appearing, and in no distress  Mental status - normal mood, behavior, speech, dress, motor activity, and thought processes  Mouth - mucous membranes moist, pharynx normal without lesions  Chest - clear to auscultation, no wheezes, rales or rhonchi, symmetric air entry  Heart - normal rate, regular rhythm, normal S1, S2, no murmurs, rubs, clicks or gallops  Musculoskeletal - no joint tenderness, deformity or swelling  Extremities - peripheral pulses normal, no pedal edema, no clubbing or cyanosis    Assessment/ Plan:   1. Type 2 diabetes mellitus without complication, without long-term current use of insulin ([facility])  -check fasting labs  -SCHEDULE EYE APPT  - LIPID PANEL  - HEMOGLOBIN A1C WITH EAG  - MICROALBUMIN, UR, RAND W/ MICROALB/CREAT RATIO    2. Essential hypertension  -well controlled  - METABOLIC PANEL, COMPREHENSIVE  - CBC WITH AUTOMATED DIFF  - TSH 3RD GENERATION  - VITAMIN D, 25 HYDROXY    3. Obesity, morbid ([address]  -work on diet and exercise, pt is working two jobs    4.  Seropositive rheumatoid arthritis of multiple sites [facility])  -advised pt that she needs to find a new rheumatologist     5. Mild depression (HCC)  -stable on current medications     6. Routine general medical examination at a health care facility  -check labs       Follow-up Disposition: As needed  I have discussed the diagnosis with the patient and the intended plan as seen in the above orders. The patient has received an after-visit summary and questions were answered concerning future plans. Medication Side Effects and Warnings were discussed with patient: yes  Patient Labs were reviewed: yes  Patient Past Records were reviewed:  yes    Surinder Guo M.D. Discussed the patient's BMI with her. The BMI follow up plan is as follows:     dietary management education, guidance, and counseling  encourage exercise  monitor weight  prescribed dietary intake    An After Visit Summary was printed and given to the patient.

## 2018-11-06 NOTE — PATIENT INSTRUCTIONS
Body Mass Index: Care Instructions  Your Care Instructions    Body mass index (BMI) can help you see if your weight is raising your risk for health problems. It uses a formula to compare how much you weigh with how tall you are. · A BMI lower than 18.5 is considered underweight. · A BMI between 18.5 and 24.9 is considered healthy. · A BMI between 25 and 29.9 is considered overweight. A BMI of 30 or higher is considered obese. If your BMI is in the normal range, it means that you have a lower risk for weight-related health problems. If your BMI is in the overweight or obese range, you may be at increased risk for weight-related health problems, such as high blood pressure, heart disease, stroke, arthritis or joint pain, and diabetes. If your BMI is in the underweight range, you may be at increased risk for health problems such as fatigue, lower protection (immunity) against illness, muscle loss, bone loss, hair loss, and hormone problems. BMI is just one measure of your risk for weight-related health problems. You may be at higher risk for health problems if you are not active, you eat an unhealthy diet, or you drink too much alcohol or use tobacco products. Follow-up care is a key part of your treatment and safety. Be sure to make and go to all appointments, and call your doctor if you are having problems. It's also a good idea to know your test results and keep a list of the medicines you take. How can you care for yourself at home? · Practice healthy eating habits. This includes eating plenty of fruits, vegetables, whole grains, lean protein, and low-fat dairy. · If your doctor recommends it, get more exercise. Walking is a good choice. Bit by bit, increase the amount you walk every day. Try for at least 30 minutes on most days of the week. · Do not smoke. Smoking can increase your risk for health problems. If you need help quitting, talk to your doctor about stop-smoking programs and medicines. These can increase your chances of quitting for good. · Limit alcohol to 2 drinks a day for men and 1 drink a day for women. Too much alcohol can cause health problems. If you have a BMI higher than 25  · Your doctor may do other tests to check your risk for weight-related health problems. This may include measuring the distance around your waist. A waist measurement of more than 40 inches in men or 35 inches in women can increase the risk of weight-related health problems. · Talk with your doctor about steps you can take to stay healthy or improve your health. You may need to make lifestyle changes to lose weight and stay healthy, such as changing your diet and getting regular exercise. If you have a BMI lower than 18.5  · Your doctor may do other tests to check your risk for health problems. · Talk with your doctor about steps you can take to stay healthy or improve your health. You may need to make lifestyle changes to gain or maintain weight and stay healthy, such as getting more healthy foods in your diet and doing exercises to build muscle. Where can you learn more? Go to http://kuldeep-genevieve.info/. Enter S176 in the search box to learn more about \"Body Mass Index: Care Instructions. \"  Current as of: October 13, 2016  Content Version: 11.4  © 5006-5881 [employer], [employer]. Care instructions adapted under license by [employer] (which disclaims liability or warranty for this information). If you have questions about a medical condition or this instruction, always ask your healthcare professional. [address] any warranty or liability for your use of this information.

## 2018-11-07 LAB
25(OH)D3+25(OH)D2 SERPL-MCNC: 24.7 NG/ML (ref 30–100)
ALBUMIN SERPL-MCNC: 3.9 G/DL (ref 3.5–5.5)
ALBUMIN/CREAT UR: 5 MG/G CREAT (ref 0–30)
ALBUMIN/GLOB SERPL: 1.4 {RATIO} (ref 1.2–2.2)
ALP SERPL-CCNC: 53 IU/L (ref 39–117)
ALT SERPL-CCNC: 15 IU/L (ref 0–32)
AST SERPL-CCNC: 16 IU/L (ref 0–40)
BASOPHILS # BLD AUTO: 0 X10E3/UL (ref 0–0.2)
BASOPHILS NFR BLD AUTO: 0 %
BILIRUB SERPL-MCNC: 0.3 MG/DL (ref 0–1.2)
BUN SERPL-MCNC: 9 MG/DL (ref 6–24)
BUN/CREAT SERPL: 13 (ref 9–23)
CALCIUM SERPL-MCNC: 8.7 MG/DL (ref 8.7–10.2)
CHLORIDE SERPL-SCNC: 103 MMOL/L (ref 96–106)
CHOLEST SERPL-MCNC: 146 MG/DL (ref 100–199)
CO2 SERPL-SCNC: 21 MMOL/L (ref 20–29)
CREAT SERPL-MCNC: 0.69 MG/DL (ref 0.57–1)
CREAT UR-MCNC: 133.6 MG/DL
EOSINOPHIL # BLD AUTO: 0 X10E3/UL (ref 0–0.4)
EOSINOPHIL NFR BLD AUTO: 0 %
ERYTHROCYTE [DISTWIDTH] IN BLOOD BY AUTOMATED COUNT: 14.8 % (ref 12.3–15.4)
EST. AVERAGE GLUCOSE BLD GHB EST-MCNC: 151 MG/DL
GLOBULIN SER CALC-MCNC: 2.7 G/DL (ref 1.5–4.5)
GLUCOSE SERPL-MCNC: 100 MG/DL (ref 65–99)
HBA1C MFR BLD: 6.9 % (ref 4.8–5.6)
HCT VFR BLD AUTO: 37.6 % (ref 34–46.6)
HDLC SERPL-MCNC: 76 MG/DL
HGB BLD-MCNC: 12.8 G/DL (ref 11.1–15.9)
IMM GRANULOCYTES # BLD: 0 X10E3/UL (ref 0–0.1)
IMM GRANULOCYTES NFR BLD: 0 %
INTERPRETATION, 910389: NORMAL
LDLC SERPL CALC-MCNC: 53 MG/DL (ref 0–99)
LYMPHOCYTES # BLD AUTO: 2 X10E3/UL (ref 0.7–3.1)
LYMPHOCYTES NFR BLD AUTO: 21 %
Lab: NORMAL
MCH RBC QN AUTO: 30.8 PG (ref 26.6–33)
MCHC RBC AUTO-ENTMCNC: 34 G/DL (ref 31.5–35.7)
MCV RBC AUTO: 90 FL (ref 79–97)
MICROALBUMIN UR-MCNC: 6.7 UG/ML
MONOCYTES # BLD AUTO: 0.5 X10E3/UL (ref 0.1–0.9)
MONOCYTES NFR BLD AUTO: 6 %
NEUTROPHILS # BLD AUTO: 6.8 X10E3/UL (ref 1.4–7)
NEUTROPHILS NFR BLD AUTO: 73 %
PLATELET # BLD AUTO: 277 X10E3/UL (ref 150–379)
POTASSIUM SERPL-SCNC: 4.4 MMOL/L (ref 3.5–5.2)
PROT SERPL-MCNC: 6.6 G/DL (ref 6–8.5)
RBC # BLD AUTO: 4.16 X10E6/UL (ref 3.77–5.28)
SODIUM SERPL-SCNC: 136 MMOL/L (ref 134–144)
TRIGL SERPL-MCNC: 86 MG/DL (ref 0–149)
TSH SERPL DL<=0.005 MIU/L-ACNC: 1.14 UIU/ML (ref 0.45–4.5)
VLDLC SERPL CALC-MCNC: 17 MG/DL (ref 5–40)
WBC # BLD AUTO: 9.4 X10E3/UL (ref 3.4–10.8)

## 2018-11-10 NOTE — PROGRESS NOTES
Diabetes is well controlled. Vitamin D is slightly low, please take a daily supplement of at least 2000 IU (international units) daily. All other labs are within normal limits. A message has been sent in [employer] and the lab work released to the patient.

## 2018-12-28 ENCOUNTER — TELEPHONE (OUTPATIENT)
Dept: FAMILY MEDICINE CLINIC | Age: 48
End: 2018-12-28

## 2018-12-28 DIAGNOSIS — M05.79 SEROPOSITIVE RHEUMATOID ARTHRITIS OF MULTIPLE SITES (HCC): ICD-10-CM

## 2018-12-28 RX ORDER — METHOTREXATE 2.5 MG/1
TABLET ORAL
Qty: 104 TAB | Refills: 0 | Status: SHIPPED | OUTPATIENT
Start: 2018-12-28 | End: 2019-01-06 | Stop reason: SDUPTHER

## 2018-12-28 NOTE — TELEPHONE ENCOUNTER
Regarding: Prescription Question  Contact: [phone]  ----- Message from [address] [place] [address] [address], Generic sent at 12/28/2018  9:56 AM EST -----    Could you please fill one more month of my methotrexate? I have made an appointment with a new rheumatologist. The appointment is Jan. 29, 2019 with Dr. Alisa Armijo at [address] [place] [address] and Rheumatology [facility].

## 2018-12-31 ENCOUNTER — DOCUMENTATION ONLY (OUTPATIENT)
Dept: FAMILY MEDICINE CLINIC | Age: 48
End: 2018-12-31

## 2018-12-31 NOTE — PROGRESS NOTES
Faxed demographics, 11/06/18 office notes/lab results to Dr Stef Oneal per [facility] request. Fax #[phone] confirmation received.

## 2019-01-06 ENCOUNTER — TELEPHONE (OUTPATIENT)
Dept: FAMILY MEDICINE CLINIC | Age: 49
End: 2019-01-06

## 2019-01-06 DIAGNOSIS — M05.79 SEROPOSITIVE RHEUMATOID ARTHRITIS OF MULTIPLE SITES (HCC): ICD-10-CM

## 2019-01-06 RX ORDER — METHOTREXATE 2.5 MG/1
TABLET ORAL
Qty: 104 TAB | Refills: 0 | Status: SHIPPED | OUTPATIENT
Start: 2019-01-06

## 2019-01-07 NOTE — TELEPHONE ENCOUNTER
Regarding: Prescription Question  Contact: [phone]  ----- Message from [address] [place] [address] [address], Generic sent at 1/4/2019 12:33 PM EST -----    Is there anyway you could refill my methotrexate prescription to my pharmacy instead of [facility] mail order? I don't use them. My pharmacy is [id number] [place] [address]. Phone number is [phone].     Thank you,  Ryann Guo

## 2019-01-29 DIAGNOSIS — M05.79 SEROPOSITIVE RHEUMATOID ARTHRITIS OF MULTIPLE SITES (HCC): ICD-10-CM

## 2019-01-29 RX ORDER — HYDROXYCHLOROQUINE SULFATE 200 MG/1
TABLET, FILM COATED ORAL
Qty: 60 TAB | Refills: 0 | OUTPATIENT
Start: 2019-01-29

## 2019-02-12 ENCOUNTER — TELEPHONE (OUTPATIENT)
Dept: FAMILY MEDICINE CLINIC | Age: 49
End: 2019-02-12

## 2019-02-12 NOTE — TELEPHONE ENCOUNTER
----- Message from Harinder Wilhelm sent at 2/12/2019 12:58 PM EST -----  Regarding: Dr. Brian Aldana, RN with Pharm MD, stated, pt is due for diabetes foot exam, eye exam and adding a \"statin\". Corwin Nunez requesting pt's medical treatment needs to be faxed.   Best contact number ([phone] reference number: [id number]

## 2019-02-12 NOTE — TELEPHONE ENCOUNTER
Pt's LDL is less than 70 without a statin. Pt had her last eye exam on 3/2/17 per our reports, will update at next appointment. Foot exam also due at next appointment.

## 2019-03-12 RX ORDER — EPINEPHRINE 0.3 MG/.3ML
INJECTION SUBCUTANEOUS
Qty: 2 SYRINGE | Refills: 1 | Status: SHIPPED | OUTPATIENT
Start: 2019-03-12 | End: 2022-10-10

## 2019-03-12 RX ORDER — LISINOPRIL 20 MG/1
TABLET ORAL
Qty: 90 TAB | Refills: 1 | Status: SHIPPED | OUTPATIENT
Start: 2019-03-12 | End: 2019-06-13 | Stop reason: SDUPTHER

## 2019-03-21 DIAGNOSIS — M05.79 SEROPOSITIVE RHEUMATOID ARTHRITIS OF MULTIPLE SITES (HCC): ICD-10-CM

## 2019-03-22 RX ORDER — FOLIC ACID 1 MG/1
TABLET ORAL
Qty: 204 TAB | Refills: 3 | Status: SHIPPED | OUTPATIENT
Start: 2019-03-22

## 2019-03-24 RX ORDER — ONDANSETRON 4 MG/1
TABLET, ORALLY DISINTEGRATING ORAL
Qty: 20 TAB | Refills: 2 | Status: SHIPPED | OUTPATIENT
Start: 2019-03-24 | End: 2021-02-22 | Stop reason: SDUPTHER

## 2019-06-04 DIAGNOSIS — N32.81 OAB (OVERACTIVE BLADDER): ICD-10-CM

## 2019-06-04 RX ORDER — OXYBUTYNIN CHLORIDE 10 MG/1
TABLET, EXTENDED RELEASE ORAL
Qty: 90 TAB | Refills: 3 | Status: SHIPPED | OUTPATIENT
Start: 2019-06-04 | End: 2022-10-10

## 2019-06-04 RX ORDER — METFORMIN HYDROCHLORIDE 500 MG/1
TABLET ORAL
Qty: 360 TAB | Refills: 3 | Status: SHIPPED | OUTPATIENT
Start: 2019-06-04

## 2019-06-04 RX ORDER — BUPROPION HYDROCHLORIDE 300 MG/1
TABLET ORAL
Qty: 90 TAB | Refills: 3 | Status: SHIPPED | OUTPATIENT
Start: 2019-06-04 | End: 2022-10-10

## 2019-06-13 RX ORDER — LISINOPRIL 20 MG/1
TABLET ORAL
Qty: 90 TAB | Refills: 1 | Status: SHIPPED | OUTPATIENT
Start: 2019-06-13

## 2019-12-03 ENCOUNTER — DOCUMENTATION ONLY (OUTPATIENT)
Dept: FAMILY MEDICINE CLINIC | Age: 49
End: 2019-12-03

## 2020-12-17 ENCOUNTER — HOSPITAL ENCOUNTER (EMERGENCY)
Age: 50
Discharge: HOME | End: 2020-12-18
Attending: EMERGENCY MEDICINE | Admitting: EMERGENCY MEDICINE
Payer: COMMERCIAL

## 2020-12-17 DIAGNOSIS — Z23 NEED FOR TDAP VACCINATION: ICD-10-CM
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
## 2020-12-17 PROCEDURE — 99285 EMERGENCY DEPT VISIT HI MDM: CPT

## 2020-12-17 PROCEDURE — 90471 IMMUNIZATION ADMIN: CPT

## 2020-12-17 NOTE — Clinical Note
Thank you for allowing us to provide you with medical care today. We realize that you have many choices for your emergency care needs. We thank you for choosing [facility]. Please choose us in the future for any continued health care needs. We hope we addressed all of your medical concerns. We strive to provide excellent quality care in the Emergency Department. Anything less than excellent does not meet our expectations. The exam and treatment you received in the Emergency Department  were for an emergent problem and are not intended as complete care. It is important that you follow up with a doctor, nurse practitioner, or physician's assistant for ongoing care. If your symptoms worsen or you do not improve as expected and you are un able to reach your usual health care provider, you should return to the Emergency Department. We are available 24 hours a day. Take this sheet with you when you go to your follow-up visit. If you have any problem arranging the follow-up visit, co ntact the Emergency Department immediately. Make an appointment your family doctor for follow up of this visit. Return to the ER if you are unable to be seen in a timely manner.

## 2020-12-18 ENCOUNTER — APPOINTMENT (OUTPATIENT)
Dept: GENERAL RADIOLOGY | Age: 50
End: 2020-12-18
Attending: EMERGENCY MEDICINE
Payer: COMMERCIAL

## 2020-12-18 VITALS
OXYGEN SATURATION: 95 % | HEART RATE: 103 BPM | WEIGHT: 293 LBS | SYSTOLIC BLOOD PRESSURE: 120 MMHG | TEMPERATURE: 98.5 F | RESPIRATION RATE: 19 BRPM | BODY MASS INDEX: 53.21 KG/M2 | DIASTOLIC BLOOD PRESSURE: 70 MMHG

## 2020-12-18 PROCEDURE — 74011250636 HC RX REV CODE- 250/636: Performed by: EMERGENCY MEDICINE

## 2020-12-18 PROCEDURE — 74011250637 HC RX REV CODE- 250/637: Performed by: EMERGENCY MEDICINE

## 2020-12-18 PROCEDURE — 90715 TDAP VACCINE 7 YRS/> IM: CPT | Performed by: EMERGENCY MEDICINE

## 2020-12-18 PROCEDURE — 73130 X-RAY EXAM OF HAND: CPT

## 2020-12-18 RX ORDER — AMOXICILLIN AND CLAVULANATE POTASSIUM 875; 125 MG/1; MG/1
1 TABLET, FILM COATED ORAL
Status: COMPLETED | OUTPATIENT
Start: 2020-12-18 | End: 2020-12-18

## 2020-12-18 RX ORDER — AMOXICILLIN AND CLAVULANATE POTASSIUM 875; 125 MG/1; MG/1
1 TABLET, FILM COATED ORAL 2 TIMES DAILY
Qty: 14 TAB | Refills: 0 | Status: SHIPPED | OUTPATIENT
Start: 2020-12-18 | End: 2020-12-25

## 2020-12-18 RX ORDER — TRAMADOL HYDROCHLORIDE 50 MG/1
50 TABLET ORAL
Status: COMPLETED | OUTPATIENT
Start: 2020-12-18 | End: 2020-12-18

## 2020-12-18 RX ADMIN — TRAMADOL HYDROCHLORIDE 50 MG: 50 TABLET ORAL at 00:24

## 2020-12-18 RX ADMIN — AMOXICILLIN AND CLAVULANATE POTASSIUM 1 TABLET: 875; 125 TABLET, FILM COATED ORAL at 01:25

## 2020-12-18 RX ADMIN — TETANUS TOXOID, REDUCED DIPHTHERIA TOXOID AND ACELLULAR PERTUSSIS VACCINE, ADSORBED 0.5 ML: 5; 2.5; 8; 8; 2.5 SUSPENSION INTRAMUSCULAR at 00:26

## 2020-12-18 NOTE — ED NOTES
Bedside and Verbal shift change report given to Khadra (oncoming nurse) by Pawan Soares (offgoing nurse). Report included the following information SBAR, ED Summary, MAR and Recent Results.

## 2020-12-18 NOTE — ED PROVIDER NOTES
Please note that this dictation was completed with [employer], the computer voice recognition software.  Quite often unanticipated grammatical, syntax, homophones, and other interpretive errors are inadvertently transcribed by the computer software.  Please disregard these errors.  Please excuse any errors that have escaped final proofreading. Recently adopted a chow mixed breed dog, was going to get him out of the chair taken to bed tonight and he attacked me. He scraped up my stomach scraped my left hand little bit but my L hand but my L thumb. Adopted him from [address] [place] [address] he is up-to-date on his shots I am not up-to-date on the tetanus and having left hand pain. Past Surgical History:  No date: HX  SECTION  No date: HX HEMORRHOIDECTOMY                 Past Medical History:   Diagnosis Date    Depression     Diabetes ([facility]     Hypertension     Rheumatoid arthritis(714.0) 3/3/2015       Past Surgical History:   Procedure Laterality Date    HX  SECTION      HX HEMORRHOIDECTOMY           Family History:   Problem Relation Age of Onset    Hypertension Mother    [address] [place] [address] Arthritis-osteo Mother        Social History     Socioeconomic History    Marital status:      Spouse name: Not on file    Number of children: Not on file    Years of education: Not on file    Highest education level: Not on file   Occupational History    Not on file   Social Needs    Financial resource strain: Not on file    Food insecurity     Worry: Not on file     Inability: Not on file    Transportation needs     Medical: Not on file     Non-medical: Not on file   Tobacco Use    Smoking status: Never Smoker    Smokeless tobacco: Never Used   Substance and Sexual Activity    Alcohol use:  Yes     Alcohol/week: 0.8 standard drinks     Types: 1 Standard drinks or equivalent per week     Comment: socially    Drug use: No    Sexual activity: Not Currently     Partners: Male   Lifestyle    Physical activity Days per week: Not on file     Minutes per session: Not on file    Stress: Not on file   Relationships    Social connections     Talks on phone: Not on file     Gets together: Not on file     Attends Caodaism service: Not on file     Active member of club or organization: Not on file     Attends meetings of clubs or organizations: Not on file     Relationship status: Not on file    Intimate partner violence     Fear of current or ex partner: Not on file     Emotionally abused: Not on file     Physically abused: Not on file     Forced sexual activity: Not on file   Other Topics Concern    Not on file   Social History Narrative    Not on file         ALLERGIES: Other food, Nuts [tree nut], Aspirin, Darvocet a500 [propoxyphene n-acetaminophen], Imitrex [sumatriptan succinate], and Sulfa (sulfonamide antibiotics)    Review of Systems   Skin: Positive for rash and wound. All other systems reviewed and are negative. There were no vitals filed for this visit. Physical Exam  Vitals signs and nursing note reviewed. Constitutional:       General: She is not in acute distress. Appearance: Normal appearance. She is well-developed. She is obese. She is not ill-appearing, toxic-appearing or diaphoretic. Comments: NAD, AxOx4, speaking in complete sentences    Abrasion to abd. R inner elbow  Abrasion to L thumb/ L post hand;    HENT:      Head: Normocephalic and atraumatic. Right Ear: External ear normal.      Left Ear: External ear normal.   Eyes:      General: No scleral icterus. Right eye: No discharge. Left eye: No discharge. Extraocular Movements: Extraocular movements intact. Conjunctiva/sclera: Conjunctivae normal.      Pupils: Pupils are equal, round, and reactive to light. Neck:      Musculoskeletal: Normal range of motion and neck supple. Vascular: No JVD. Trachea: No tracheal deviation.    Cardiovascular:      Rate and Rhythm: Normal rate and regular rhythm. Heart sounds: Normal heart sounds. No murmur. No friction rub. No gallop. Pulmonary:      Effort: Pulmonary effort is normal. No respiratory distress. Breath sounds: Normal breath sounds. No wheezing or rales. Chest:      Chest wall: No tenderness. Abdominal:      General: Bowel sounds are normal.      Palpations: Abdomen is soft. Tenderness: There is no abdominal tenderness. There is no guarding or rebound. Comments: Noted midline abrasion, 6 cm in length   Genitourinary:     Vagina: No vaginal discharge. Musculoskeletal: Normal range of motion. General: Swelling, tenderness and signs of injury present. No deformity. Right lower leg: No edema. Left lower leg: No edema. Comments: R elbow - noted abrasion;     L hand - noted abrasion to thumb pad/ min ttp/ also dorsal hand small abrasion noted; pt has distal motor/ CV/ Sensation grossly intact all 5 L fingers;    Skin:     General: Skin is warm and dry. Capillary Refill: Capillary refill takes less than 2 seconds. Coloration: Skin is not pale. Findings: No bruising, erythema, lesion or rash. Neurological:      General: No focal deficit present. Mental Status: She is alert and oriented to person, place, and time. Cranial Nerves: No cranial nerve deficit. Sensory: No sensory deficit. Motor: No weakness or abnormal muscle tone. Coordination: Coordination normal.      Gait: Gait normal.      Deep Tendon Reflexes: Reflexes normal.   Psychiatric:         Behavior: Behavior normal.         Thought Content: Thought content normal.          MDM       Procedures    2:34 AM  Chayito MELGAR Camden's  results have been reviewed with her. She has been counseled regarding her diagnosis.   She verbally conveys understanding and agreement of the signs, symptoms, diagnosis, treatment and prognosis and additionally agrees to Call/ Arrange follow up as recommended with  Elan Smith MD in 24 - 48 hours. She also agrees with the care-plan and conveys that all of her questions have been answered. I have also put together some discharge instructions for her that include: 1) educational information regarding their diagnosis, 2) how to care for their diagnosis at home, as well a 3) list of reasons why they would want to return to the ED prior to their follow-up appointment, should their condition change or for concerns.

## 2020-12-18 NOTE — ED TRIAGE NOTES
Pt. States was bit tonight by a new dog they just adopted. Pt. Has all the shot record with her. Pt. Has bites/abrasion noted to right AC, left hand, and lower abd/umbilical area.

## 2020-12-18 NOTE — DISCHARGE INSTRUCTIONS
Patient Education        Animal Bites: Care Instructions  Your Care Instructions  After an animal bite, the biggest concern is infection. The chance of infection depends on the type of animal that bit you, where on your body you were bitten, and your general health. Many animal bites are not closed with stitches, because this can increase the chance of infection. Your bite may take as little as 7 days or as long as several months to heal, depending on how bad it is. Taking good care of your wound at home will help it heal and reduce your chance of infection. The doctor has checked you carefully, but problems can develop later. If you notice any problems or new symptoms, get medical treatment right away. Follow-up care is a key part of your treatment and safety. Be sure to make and go to all appointments, and call your doctor if you are having problems. It's also a good idea to know your test results and keep a list of the medicines you take. How can you care for yourself at home? · If your doctor told you how to care for your wound, follow your doctor's instructions. If you did not get instructions, follow this general advice:  ? After 24 to 48 hours, gently wash the wound with clean water 2 times a day. Do not scrub or soak the wound. Don't use hydrogen peroxide or alcohol, which can slow healing. ? You may cover the wound with a thin layer of petroleum jelly, such as Vaseline, and a nonstick bandage. ? Apply more petroleum jelly and replace the bandage as needed. · After you shower, gently dry the wound with a clean towel. · If your doctor has closed the wound, cover the bandage with a plastic bag before you take a shower. · A small amount of skin redness and swelling around the wound edges and the stitches or staples is normal. Your wound may itch or feel irritated. Do not scratch or rub the wound.   · Ask your doctor if you can take an over-the-counter pain medicine, such as acetaminophen (Tylenol), ibuprofen (Advil, Motrin), or naproxen (Aleve). Read and follow all instructions on the label. · Do not take two or more pain medicines at the same time unless the doctor told you to. Many pain medicines have acetaminophen, which is Tylenol. Too much acetaminophen (Tylenol) can be harmful. · If your bite puts you at risk for rabies, you will get a series of shots over the next few weeks to prevent rabies. Your doctor will tell you when to get the shots. It is very important that you get the full cycle of shots. Follow your doctor's instructions exactly. · You may need a tetanus shot if you have not received one in the last 5 years. · If your doctor prescribed antibiotics, take them as directed. Do not stop taking them just because you feel better. You need to take the full course of antibiotics. When should you call for help? Call your doctor now or seek immediate medical care if:    · The skin near the bite turns cold or pale or it changes color.     · You lose feeling in the area near the bite, or it feels numb or tingly.     · You have trouble moving a limb near the bite.     · You have symptoms of infection, such as:  ? Increased pain, swelling, warmth, or redness near the wound. ? Red streaks leading from the wound. ? Pus draining from the wound. ? A fever.     · Blood soaks through the bandage. Oozing small amounts of blood is normal.     · Your pain is getting worse. Watch closely for changes in your health, and be sure to contact your doctor if you are not getting better as expected. Where can you learn more? Go to http://www.gray.com/  Enter A444 in the search box to learn more about \"Animal Bites: Care Instructions. \"  Current as of: June 26, 2019               Content Version: 12.6  © 1988-8844 [employer]. Care instructions adapted under license by [employer] (which disclaims liability or warranty for this information).  If you have questions about a medical condition or this instruction, always ask your healthcare professional. [place] [address] any warranty or liability for your use of this information. Patient Education        DTaP (Diphtheria, Tetanus, Pertussis) Vaccine: What You Need to Know  Why get vaccinated? DTaP vaccine can prevent diphtheria, tetanus, and pertussis. Diphtheria and pertussis spread from person to person. Tetanus enters the body through cuts or wounds. · DIPHTHERIA (D) can lead to difficulty breathing, heart failure, paralysis, or death. · TETANUS (T) causes painful stiffening of the muscles. Tetanus can lead to serious health problems, including being unable to open the mouth, having trouble swallowing and breathing, or death. · PERTUSSIS (aP), also known as \"whooping cough,\" can cause uncontrollable, violent coughing which makes it hard to breathe, eat, or drink. Pertussis can be extremely serious in babies and young children, causing pneumonia, convulsions, brain damage, or death. In teens and adults, it can cause weight loss, loss of bladder control, passing out, and rib fractures from severe coughing. DTaP vaccine  DTaP is only for children younger than 9years old. Different vaccines against tetanus, diphtheria, and pertussis (Tdap and Td) are available for older children, adolescents, and adults. It is recommended that children receive 5 doses of DTaP, usually at the following ages:  · 2 months  · 4 months  · 6 months  · 15-18 months  · 4-6 years  DTaP may be given as a stand-alone vaccine, or as part of a combination vaccine (a type of vaccine that combines more than one vaccine together into one shot). DTaP may be given at the same time as other vaccines.   Talk with your health care provider  Tell your vaccine provider if the person getting the vaccine:  · Has had an allergic reaction after a previous dose of any vaccine that protects against tetanus, diphtheria, or pertussis, or has any severe, life threatening allergies. · Has had a coma, decreased level of consciousness, or prolonged seizures within 7 days after a previous dose of any pertussis vaccine (DTP or DTaP). · Has seizures or another nervous system problem. · Has ever had Guillain-Barré Syndrome (also called GBS). · Has had severe pain or swelling after a previous dose of any vaccine that protects against tetanus or diphtheria. In some cases, your child's health care provider may decide to postpone DTaP vaccination to a future visit. Children with minor illnesses, such as a cold, may be vaccinated. Children who are moderately or severely ill should usually wait until they recover before getting DTaP. Your child's health care provider can give you more information. Risks of a vaccine reaction  · Soreness or swelling where the shot was given, fever, fussiness, feeling tired, loss of appetite, and vomiting sometimes happen after DTaP vaccination. · More serious reactions, such as seizures, non-stop crying for 3 hours or more, or high fever (over 105°F) after DTaP vaccination happen much less often. Rarely, the vaccine is followed by swelling of the entire arm or leg, especially in older children when they receive their fourth or fifth dose. · Very rarely, long-term seizures, coma, lowered consciousness, or permanent brain damage may happen after DTaP vaccination. As with any medicine, there is a very remote chance of a vaccine causing a severe allergic reaction, other serious injury, or death. What if there is a serious problem? An allergic reaction could occur after the vaccinated person leaves the clinic. If you see signs of a severe allergic reaction (hives, swelling of the face and throat, difficulty breathing, a fast heartbeat, dizziness, or weakness), call 9-1-1 and get the person to the nearest hospital.  For other signs that concern you, call your health care provider.   Adverse reactions should be reported to the Vaccine Adverse Event Reporting System (VAERS). Your health care provider will usually file this report, or you can do it yourself. Visit the VAERS website at www.vaers. hhs.gov or call 0-387.764.8703. VAERS is only for reporting reactions, and VAERS staff do not give medical advice. The National Vaccine Injury Compensation Program  The National Vaccine Injury Compensation Program (VICP) is a federal program that was created to compensate people who may have been injured by certain vaccines. Visit the VICP website at www.[facility].gov/vaccinecompensation or call 4-407.322.2855 to learn about the program and about filing a claim. There is a time limit to file a claim for compensation. How can I learn more? · Ask your health care provider. · Call your local or state health department. · Contact the Centers for Disease Control and Prevention (CDC):  ? Call 5-665.780.3075 (1-800-CDC-INFO) or  ? Visit CDC's website at www.cdc.gov/vaccines  Vaccine Information Statement (Interim)  DTaP (Diphtheria, Tetanus, Pertussis) Vaccine  04/01/2020  [address] SANTI [employer] [address]ui [id number]  Department of Health and Human Services  Centers for Disease Control and Prevention  Many Vaccine Information Statements are available in [place] and other languages. See www.immunize.org/vis. Muchas hojas de información sobre vacunas están disponibles en español y en otros idiomas. Visite www.immunize.org/vis. Care instructions adapted under license by [employer] (which disclaims liability or warranty for this information). If you have questions about a medical condition or this instruction, always ask your healthcare professional. [place] [address] any warranty or liability for your use of this information.

## 2021-02-22 ENCOUNTER — OFFICE VISIT (OUTPATIENT)
Dept: PRIMARY CARE CLINIC | Age: 51
End: 2021-02-22

## 2021-02-22 VITALS
SYSTOLIC BLOOD PRESSURE: 124 MMHG | HEART RATE: 104 BPM | TEMPERATURE: 97.9 F | OXYGEN SATURATION: 97 % | DIASTOLIC BLOOD PRESSURE: 76 MMHG

## 2021-02-22 DIAGNOSIS — R05.9 COUGH: ICD-10-CM

## 2021-02-22 DIAGNOSIS — Z20.822 EXPOSURE TO COVID-19 VIRUS: Primary | ICD-10-CM

## 2021-02-22 DIAGNOSIS — Z20.822 SUSPECTED 2019 NOVEL CORONAVIRUS INFECTION: ICD-10-CM

## 2021-02-22 DIAGNOSIS — R11.0 NAUSEA: ICD-10-CM

## 2021-02-22 DIAGNOSIS — R53.83 FATIGUE, UNSPECIFIED TYPE: ICD-10-CM

## 2021-02-22 PROCEDURE — 99202 OFFICE O/P NEW SF 15 MIN: CPT | Performed by: NURSE PRACTITIONER

## 2021-02-22 RX ORDER — ONDANSETRON 4 MG/1
TABLET, ORALLY DISINTEGRATING ORAL
Qty: 20 TAB | Refills: 2 | Status: SHIPPED | OUTPATIENT
Start: 2021-02-22

## 2021-02-22 NOTE — PROGRESS NOTES
Delicia Donovan is a 53 y.o. female who was seen in clinic today (2/22/2021) for an acute visit. Assessment/Plan:            * COVID-19 sample collected and submitted       * Patient given detailed CDC instructions contained within After Visit Summary    Diagnoses and all orders for this visit:    1. Exposure to COVID-19 virus  -     NOVEL CORONAVIRUS (COVID-19)    2. Suspected 2019 novel coronavirus infection    3. Cough  -     NOVEL CORONAVIRUS (COVID-19)    4. Fatigue, unspecified type  -     NOVEL CORONAVIRUS (COVID-19)    5. Nausea  -     NOVEL CORONAVIRUS (COVID-19)       known covid exposure. Discussed expected course/resolution/complications of diagnosis in detail with patient. Advised pt on CDC guidance, OTC medications for symptom management, red flags reviewed and should develop to seek emergency medical attn. Reviewed with her that COVID-19 pandemic is an evolving situation with rapidly changing recommendations & guidelines, continue to practice hand hygiene, social distancing, wearing of facial coverings. Regardless of testing results, should still follow CDC guidelines as there is a chance of a false negative, such as a poor sample collection or being too soon to test after exposure. Medical decisions are made based on the the best information available at the time. Recommended she stay tuned for updates published by trusted sources and to advise your PCP of any unexpected changes in clinical condition     Subjective:   Kelly Liu was seen today for [employer] employee with c/o symptoms that began 2/19, per employee health patient had rapid COVID test done over the weekend with negative result. Shortness of Breath, Generalized Body Aches, Headache, Fatigue, Nausea, and Diarrhea. Taking ibuprofen with some relief, has hx of RA, most bothersome symptom for her is the bodyaches and the nausea. She has been using zofran with relief, but has exhausted her current rx.    She denies a recent history/current: loss of smell/taste, fever, wheezing. Non user of tob. Travel Screening     Question   Response    In the last month, have you been in contact with someone who was confirmed or suspected to have Coronavirus / COVID-19? Yes    Have you had a COVID-19 viral test in the last 14 days? Yes - Negative result    Do you have any of the following new or worsening symptoms? Cough; Shortness of breath;Muscle pain; Fatigue;Diarrhea; Loss of taste    Have you traveled internationally in the last month? No      Travel History   Travel since 01/22/21     No documented travel since 01/22/21          ROS - Pertinent items are noted in HPI    Patient Active Problem List   Diagnosis Code    Diabetes ([facility] E11.9    Hypertension I10    Seropositive rheumatoid arthritis of multiple sites ([facility] M05.79    Long-term use of immunosuppressant medication Z79.899    Obesity, morbid ([facility] E66.01    Mild depression ([facility] F32.0     Home Medications    Medication Sig Start Date End Date Taking?  Authorizing Provider   lisinopril (PRINIVIL, ZESTRIL) 20 mg tablet TAKE 1 TABLET BY MOUTH EVERY DAY 6/13/19   Shawnee Simons MD   buPROPion XL (WELLBUTRIN XL) 300 mg XL tablet TAKE 1 TABLET BY MOUTH EVERY MORNING 6/4/19   Shawnee Simons MD   oxybutynin chloride XL (DITROPAN XL) 10 mg CR tablet TAKE 1 TABLET BY MOUTH EVERY DAY 6/4/19   Shawnee Simons MD   metFORMIN (GLUCOPHAGE) 500 mg tablet TAKE 2 TABLETS BY MOUTH TWICE A DAY WITH MEALS 6/4/19   Shawnee Simons MD   ondansetron (ZOFRAN ODT) 4 mg disintegrating tablet DISSOLVE 1 TABLET BY MOUTH EVERY 8 HOURS AS NEEDED FOR NAUSEA 3/24/19   Shawnee Simons MD   folic acid (FOLVITE) 1 mg tablet TAKE 2 TABLETS DAILY ON TUESDAYS THROUGH FRIDAYS AND 3 TABS DAILY ON SATURDAYS THROUGH MONDAYS 3/22/19   Shawnee Simons MD   EPINEPHrine (EPIPEN) 0.3 mg/0.3 mL injection INJECT 1 PEN INTRAMUSCULARLY AS NEEDED 3/12/19   Tc Simons MD Grace   SITagliptin (JANUVIA) 100 mg tablet Take 1 Tab by mouth daily. 3/12/19   Sadia Simons MD   methotrexate (RHEUMATREX) 2.5 mg tablet TAKE 20 MG (8 TABLETS) ONCE WEEKLY. 1/6/19   Sadia Simons MD   hydroxychloroquine (PLAQUENIL) 200 mg tablet TAKE 1 TABLET BY MOUTH TWICE A DAY 12/30/18   Sadia Simons MD   folic acid (FOLVITE) 1 mg tablet TAKE 2 TABLETS DAILY ON TUESDAYS THROUGH FRIDAYS AND 3 TABS DAILY ON SATURDAYS THROUGH MONDAYS 11/6/18   Sadia Simons MD   levonorgestrel-ethinyl estradiol (SEASONALE) 0.15 mg-30 mcg 3MPk TAKE 1 TABLET BY MOUTH EVERY DAY 1/16/17   Sadia Simons MD   naproxen sodium (ALEVE) 220 mg tablet Take 660 mg by mouth two (2) times daily (with meals). Provider, Historical   CHOLECALCIFEROL, VITAMIN D3, PO Take 5,000 Units by mouth daily. Provider, Historical      Allergies   Allergen Reactions    Other Food Hives and Shortness of Breath     Peanuts    Nuts [Tree Nut] Hives and Shortness of Breath    Aspirin Hives    Darvocet A500 [Propoxyphene N-Acetaminophen] Other (comments)     migraine    Imitrex [Sumatriptan Succinate] Palpitations    Sulfa (Sulfonamide Antibiotics) Hives          Objective:   Physical Exam  General:  alert, cooperative, frequent coughing observed in NAD   Ears: Normal external ear canals AU   Sinuses: Normal paranasal sinuses without tenderness   Neck: supple, symmetrical, trachea midline. Heart: Slightly fast rate, regular rhythm   Lungs: No dyspneic or audible respiratory distress. cta bilat       Visit Vitals  /76 (BP 1 Location: Left upper arm, BP Patient Position: Sitting)   Pulse (!) 104   Temp 97.9 °F (36.6 °C) (Skin)   SpO2 97%         Disclaimer:        Medication risks/benefits/costs/interactions/alternatives discussed with patient. She was given an after visit summary which includes diagnoses, current medications, & vitals.   She expressed understanding with the diagnosis and plan. Aspects of this note may have been generated using voice recognition software. Despite editing, there may be some syntax errors.        Aman Canela NP

## 2021-02-24 ENCOUNTER — PATIENT MESSAGE (OUTPATIENT)
Dept: PRIMARY CARE CLINIC | Age: 51
End: 2021-02-24

## 2021-02-24 LAB — SARS-COV-2, NAA: NOT DETECTED

## 2021-03-15 ENCOUNTER — TELEPHONE (OUTPATIENT)
Dept: PRIMARY CARE CLINIC | Age: 51
End: 2021-03-15

## 2021-03-15 RX ORDER — METHYLPREDNISOLONE 4 MG/1
TABLET ORAL
Qty: 1 DOSE PACK | Refills: 0 | Status: SHIPPED | OUTPATIENT
Start: 2021-03-15 | End: 2022-05-16 | Stop reason: SDUPTHER

## 2021-06-11 ENCOUNTER — ANESTHESIA (OUTPATIENT)
Dept: ENDOSCOPY | Age: 51
End: 2021-06-11
Payer: COMMERCIAL

## 2021-06-11 ENCOUNTER — ANESTHESIA EVENT (OUTPATIENT)
Dept: ENDOSCOPY | Age: 51
End: 2021-06-11
Payer: COMMERCIAL

## 2021-06-11 ENCOUNTER — HOSPITAL ENCOUNTER (OUTPATIENT)
Age: 51
Setting detail: OUTPATIENT SURGERY
Discharge: HOME | End: 2021-06-11
Attending: SPECIALIST | Admitting: SPECIALIST
Payer: COMMERCIAL

## 2021-06-11 VITALS
HEART RATE: 99 BPM | HEIGHT: 64 IN | RESPIRATION RATE: 16 BRPM | BODY MASS INDEX: 50.02 KG/M2 | DIASTOLIC BLOOD PRESSURE: 73 MMHG | SYSTOLIC BLOOD PRESSURE: 117 MMHG | WEIGHT: 293 LBS | TEMPERATURE: 98 F | OXYGEN SATURATION: 100 %

## 2021-06-11 LAB
GLUCOSE BLD STRIP.AUTO-MCNC: 138 MG/DL (ref 65–117)
HCG UR QL: NEGATIVE
SERVICE CMNT-IMP: ABNORMAL

## 2021-06-11 PROCEDURE — 74011250637 HC RX REV CODE- 250/637: Performed by: SPECIALIST

## 2021-06-11 PROCEDURE — 82962 GLUCOSE BLOOD TEST: CPT

## 2021-06-11 PROCEDURE — 76040000019: Performed by: SPECIALIST

## 2021-06-11 PROCEDURE — 74011000250 HC RX REV CODE- 250: Performed by: NURSE ANESTHETIST, CERTIFIED REGISTERED

## 2021-06-11 PROCEDURE — 2709999900 HC NON-CHARGEABLE SUPPLY: Performed by: SPECIALIST

## 2021-06-11 PROCEDURE — 76060000031 HC ANESTHESIA FIRST 0.5 HR: Performed by: SPECIALIST

## 2021-06-11 PROCEDURE — 81025 URINE PREGNANCY TEST: CPT

## 2021-06-11 PROCEDURE — 74011250636 HC RX REV CODE- 250/636: Performed by: NURSE ANESTHETIST, CERTIFIED REGISTERED

## 2021-06-11 RX ORDER — GLYCOPYRROLATE 0.2 MG/ML
INJECTION INTRAMUSCULAR; INTRAVENOUS AS NEEDED
Status: DISCONTINUED | OUTPATIENT
Start: 2021-06-11 | End: 2021-06-11 | Stop reason: HOSPADM

## 2021-06-11 RX ORDER — FENTANYL CITRATE 50 UG/ML
25 INJECTION, SOLUTION INTRAMUSCULAR; INTRAVENOUS AS NEEDED
Status: DISCONTINUED | OUTPATIENT
Start: 2021-06-11 | End: 2021-06-11 | Stop reason: HOSPADM

## 2021-06-11 RX ORDER — SODIUM CHLORIDE 9 MG/ML
INJECTION, SOLUTION INTRAVENOUS
Status: DISCONTINUED | OUTPATIENT
Start: 2021-06-11 | End: 2021-06-11 | Stop reason: HOSPADM

## 2021-06-11 RX ORDER — DEXTROMETHORPHAN/PSEUDOEPHED 2.5-7.5/.8
1.2 DROPS ORAL
Status: DISCONTINUED | OUTPATIENT
Start: 2021-06-11 | End: 2021-06-11 | Stop reason: HOSPADM

## 2021-06-11 RX ORDER — SODIUM CHLORIDE 9 MG/ML
50 INJECTION, SOLUTION INTRAVENOUS CONTINUOUS
Status: DISCONTINUED | OUTPATIENT
Start: 2021-06-11 | End: 2021-06-11 | Stop reason: HOSPADM

## 2021-06-11 RX ORDER — ACETAMINOPHEN AND CODEINE PHOSPHATE 120; 12 MG/5ML; MG/5ML
SOLUTION ORAL
COMMUNITY

## 2021-06-11 RX ORDER — FLUMAZENIL 0.1 MG/ML
0.2 INJECTION INTRAVENOUS
Status: DISCONTINUED | OUTPATIENT
Start: 2021-06-11 | End: 2021-06-11 | Stop reason: HOSPADM

## 2021-06-11 RX ORDER — MIDAZOLAM HYDROCHLORIDE 1 MG/ML
.25-5 INJECTION, SOLUTION INTRAMUSCULAR; INTRAVENOUS AS NEEDED
Status: DISCONTINUED | OUTPATIENT
Start: 2021-06-11 | End: 2021-06-11 | Stop reason: HOSPADM

## 2021-06-11 RX ORDER — PROPOFOL 10 MG/ML
INJECTION, EMULSION INTRAVENOUS
Status: DISCONTINUED | OUTPATIENT
Start: 2021-06-11 | End: 2021-06-11 | Stop reason: HOSPADM

## 2021-06-11 RX ORDER — LIDOCAINE HYDROCHLORIDE 20 MG/ML
INJECTION, SOLUTION INFILTRATION; PERINEURAL AS NEEDED
Status: DISCONTINUED | OUTPATIENT
Start: 2021-06-11 | End: 2021-06-11 | Stop reason: HOSPADM

## 2021-06-11 RX ORDER — NALOXONE HYDROCHLORIDE 0.4 MG/ML
0.4 INJECTION, SOLUTION INTRAMUSCULAR; INTRAVENOUS; SUBCUTANEOUS
Status: DISCONTINUED | OUTPATIENT
Start: 2021-06-11 | End: 2021-06-11 | Stop reason: HOSPADM

## 2021-06-11 RX ADMIN — GLYCOPYRROLATE 0.2 MG: 0.2 INJECTION INTRAMUSCULAR; INTRAVENOUS at 10:39

## 2021-06-11 RX ADMIN — SIMETHICONE 80 MG: 20 SUSPENSION/ DROPS ORAL at 10:47

## 2021-06-11 RX ADMIN — LIDOCAINE HYDROCHLORIDE 60 MG: 20 INJECTION, SOLUTION INFILTRATION; PERINEURAL at 10:39

## 2021-06-11 RX ADMIN — PROPOFOL INJECTABLE EMULSION 300 MCG/KG/MIN: 10 INJECTION, EMULSION INTRAVENOUS at 10:41

## 2021-06-11 RX ADMIN — SODIUM CHLORIDE: 9 INJECTION, SOLUTION INTRAVENOUS at 10:32

## 2021-06-11 NOTE — PROCEDURES
[address] [facility] SHARI Douglas MD  [phone]      2021    Colonoscopy Procedure Note  Felisa Zayas  :  1970  [facility] Medical Record Number: [id number]    Indications:     Screening colonoscopy  PCP:  Robin Louis MD  Anesthesia/Sedation: Conscious Sedation/Moderate Sedation/GETA, see notes  Endoscopist:  Dr. Ashtyn Maravilla  Complications:  None  Estimated Blood Loss:  None    Permit:  The indications, risks, benefits and alternatives were reviewed with the patient or their decision maker who was provided an opportunity to ask questions and all questions were answered. The specific risks of colonoscopy with conscious sedation were reviewed, including but not limited to anesthetic complication, bleeding, adverse drug reaction, missed lesion, infection, IV site reactions, and intestinal perforation which would lead to the need for surgical repair. Alternatives to colonoscopy including radiographic imaging, observation without testing, or laboratory testing were reviewed including the limitations of those alternatives. After considering the options and having all their questions answered, the patient or their decision maker provided both verbal and written consent to proceed. Procedure in Detail:  After obtaining informed consent, positioning of the patient in the left lateral decubitus position, and conduction of a pre-procedure pause or \"time out\" the endoscope was introduced into the anus and advanced to the cecum, which was identified by the ileocecal valve and appendiceal orifice. The quality of the colonic preparation was good. A careful inspection was made as the colonoscope was withdrawn, findings and interventions are described below. Findings:    There is diverticulosis in the sigmoid colon without complications such as bleeding, inflammatory change, or luminal narrowing. Specimens:    none    Complications:   None; patient tolerated the procedure well. Impression:  Otherwise normal colonoscopy to the cecum    Recommendations:      - Colonoscopy in 10 years. Thank you for entrusting me with this patient's care. Please do not hesitate to contact me with any questions or if I can be of assistance with any of your other patients' GI needs. Signed By: Ignacia Mansfield MD                        June 11, 2021      Surgical assistant none. Implants none unless specified.

## 2021-06-11 NOTE — PROGRESS NOTES
Endoscopy discharge instructions have been reviewed and given to patient. The patient verbalized understanding and acceptance of instructions. Dr. Nestor Terrell discussed with patient procedure findings and next steps.

## 2021-06-11 NOTE — INTERVAL H&P NOTE
Pre-Endoscopy H&P Update  Chief complaint/HPI/ROS:  The indication for the procedure, the patient's history and the patient's current medications are reviewed prior to the procedure and that data is reported on the H&P to which this document is attached. Any significant complaints with regard to organ systems will be noted, and if not mentioned then a review of systems is not contributory. Past Medical History:   Diagnosis Date    Arthritis     Depression     Diabetes ([address]     Hypertension     Rheumatoid arthritis(714.0) 3/3/2015      Past Surgical History:   Procedure Laterality Date    HX  SECTION      HX HEMORRHOIDECTOMY      HX OTHER SURGICAL      Hemorrhoidectomy      Social   Social History     Tobacco Use    Smoking status: Never Smoker    Smokeless tobacco: Never Used   Substance Use Topics    Alcohol use: Yes     Alcohol/week: 0.8 standard drinks     Types: 1 Standard drinks or equivalent per week     Comment: socially      Family History   Problem Relation Age of Onset    Hypertension Mother     Arthritis-osteo Mother       Allergies   Allergen Reactions    Other Food Hives and Shortness of Breath     Peanuts    Nuts [Tree Nut] Hives and Shortness of Breath    Aspirin Hives    Darvocet A500 [Propoxyphene N-Acetaminophen] Other (comments)     migraine    Imitrex [Sumatriptan Succinate] Palpitations    Sulfa (Sulfonamide Antibiotics) Hives      Prior to Admission Medications   Prescriptions Last Dose Informant Patient Reported? Taking? CHOLECALCIFEROL, VITAMIN D3, PO 2021  Yes No   Sig: Take 5,000 Units by mouth daily. EPINEPHrine (EPIPEN) 0.3 mg/0.3 mL injection Not Taking at Unknown time  No No   Sig: INJECT 1 PEN INTRAMUSCULARLY AS NEEDED   Patient not taking: Reported on 2021   OTHER 6/10/2021  Yes Yes   Sig: Mybetric 25mg every day   SITagliptin (JANUVIA) 100 mg tablet 2021  No No   Sig: Take 1 Tab by mouth daily.    buPROPion XL (WELLBUTRIN XL) 300 mg XL tablet 6/10/2021 at Unknown time  No Yes   Sig: TAKE 1 TABLET BY MOUTH EVERY MORNING   Patient taking differently: 150 mg.   cetirizine HCl (ZYRTEC PO) 2021  Yes Yes   Sig: Take  by mouth. folic acid (FOLVITE) 1 mg tablet 2021  No No   Sig: TAKE 2 TABLETS DAILY ON  THROUGH  AND 3 TABS DAILY ON  THROUGH    hydroxychloroquine (PLAQUENIL) 200 mg tablet 6/10/2021 at Unknown time  No Yes   Sig: TAKE 1 TABLET BY MOUTH TWICE A DAY   lisinopril (PRINIVIL, ZESTRIL) 20 mg tablet 2021  No No   Sig: TAKE 1 TABLET BY MOUTH EVERY DAY   metFORMIN (GLUCOPHAGE) 500 mg tablet 6/10/2021 at Unknown time  No Yes   Sig: TAKE 2 TABLETS BY MOUTH TWICE A DAY WITH MEALS   methotrexate (RHEUMATREX) 2.5 mg tablet 2021  No No   Sig: TAKE 20 MG (8 TABLETS) ONCE WEEKLY. methylPREDNISolone (MEDROL DOSEPACK) 4 mg tablet Not Taking at Unknown time  No No   Si pack as per pack instructions   Patient not taking: Reported on 2021   naproxen sodium (ALEVE) 220 mg tablet 6/10/2021 at Unknown time  Yes Yes   Sig: Take 660 mg by mouth two (2) times daily (with meals). norethindrone Floyce Georgis) 0.35 mg tab 6/10/2021 at Unknown time  Yes Yes   Sig: Take  by mouth. ondansetron (ZOFRAN ODT) 4 mg disintegrating tablet 2021  No No   Sig: DISSOLVE 1 TABLET BY MOUTH EVERY 8 HOURS AS NEEDED FOR NAUSEA   oxybutynin chloride XL (DITROPAN XL) 10 mg CR tablet Not Taking at Unknown time  No No   Sig: TAKE 1 TABLET BY MOUTH EVERY DAY   Patient not taking: Reported on 2021      Facility-Administered Medications: None       PHYSICAL EXAM:  The patient is examined immediately prior to the procedure. Visit Vitals  /66   Pulse 94   Temp 98.5 °F (36.9 °C)   Resp 16   Ht 5' 4\" (1.626 m)   Wt 140.9 kg (310 lb 10.1 oz)   SpO2 95%   Breastfeeding No   BMI 53.32 kg/m²     Gen: Appears comfortable, no distress.   Pulm: comfortable respirations with no abnormal audible breath sounds  HEART: well perfused, no abnormal audible heart sounds  GI: abdomen flat. PLAN:  Informed consent discussion held, patient afforded an opportunity to ask questions and all questions answered. After being advised of the risks, benefits, and alternatives, the patient requested that we proceed and indicated so on a written consent form. Will proceed with procedure as planned.   Prashant Driscoll MD

## 2021-06-11 NOTE — ANESTHESIA PREPROCEDURE EVALUATION
Relevant Problems   NEUROLOGY   (+) Mild depression (HCC)      CARDIOVASCULAR   (+) Hypertension      ENDOCRINE   (+) Diabetes (HCC)   (+) Obesity, morbid (HCC)   (+) Seropositive rheumatoid arthritis of multiple sites [facility])       Anesthetic History   No history of anesthetic complications            Review of Systems / Medical History  Patient summary reviewed, nursing notes reviewed and pertinent labs reviewed    Pulmonary  Within defined limits                 Neuro/Psych   Within defined limits           Cardiovascular    Hypertension              Exercise tolerance: >4 METS     GI/Hepatic/Renal  Within defined limits              Endo/Other    Diabetes    Morbid obesity and arthritis     Other Findings              Physical Exam    Airway  Mallampati: II    Neck ROM: normal range of motion   Mouth opening: Normal     Cardiovascular  Regular rate and rhythm,  S1 and S2 normal,  no murmur, click, rub, or gallop  Rhythm: regular  Rate: normal         Dental  No notable dental hx       Pulmonary  Breath sounds clear to auscultation               Abdominal  GI exam deferred       Other Findings            Anesthetic Plan    ASA: 3  Anesthesia type: MAC          Induction: Intravenous  Anesthetic plan and risks discussed with: Patient

## 2021-06-11 NOTE — H&P
53 y.o. female for open access colonoscopy for screening   Additional data for completion of the targeted pre-endoscopy H&P will be provided under 'H&P interval notes'. Please see that document which will be attached to this.   La Pizarro MD

## 2021-06-11 NOTE — PERIOP NOTES
Left a voicemail informing the patient that patient would need a COVID test or COVID vaccine card to proceed with procedure.

## 2021-06-11 NOTE — DISCHARGE INSTRUCTIONS
[address] D. Lynford Nyhan, MD  [phone]      June 11, 2021    Rayray Marshall  YOB: 1970    COLONOSCOPY DISCHARGE INSTRUCTIONS    If there is redness at IV site you should apply warm compress to area. If redness or soreness persist contact Dr. Lynford Nyhan' or your primary care doctor. There may be a slight amount of blood passed from the rectum. Gaseous discomfort may develop, but walking, belching will help relieve this. You may not operate a vehicle for 12 hours  You may not operate machinery or dangerous appliances for rest of today  You may not drink alcoholic beverages for 12 hours  Avoid making any critical decisions for 24 hours    DIET:  You may resume your normal diet, but some patients find that heavy or large meals may lead to indigestion or vomiting. I suggest a light meal as first food intake. MEDICATIONS:  The use of some over-the-counter pain medication may lead to bleeding after colon biopsies or polyp removal.  Tylenol (also called acetaminophen) is safe to take even if you have had colonoscopy with polyp removal.  Based on the procedure you had today you may safely take aspirin or aspirin-like products for the next ten (10) days. Remember that Tylenol (also called acetaminophen) is safe to take after colonoscopy even if you have had biopsies or polyps removed. ACTIVITY:  You may resume your normal household activities, but it is recommended that you spend the remainder of the day resting -  avoid any strenuous activity. CALL DR. Marisa Barton' OFFICE IF:  Increasing pain, nausea, vomiting  Abdominal distension (swelling)  Significant new or increased bleeding (oral or rectal)  Fever/Chills  Chest pain/shortness of breath                       Additional instructions:   Great news, colonoscopy today was without polyps or colon cancer. Next colonoscopy 10 years.      It was an honor to be your doctor today. Please let me or my office staff know if you have any feedback about today's procedure. Linda Lay MD    Colonoscopy saves lives, and can prevent colon cancer. Everyone aged 48 or older needs colonoscopy.   Tell your family and friends: get the test!

## 2021-06-11 NOTE — PROGRESS NOTES
[address] [facility] RAMÓN Barclays  1970  [id number]    Situation:  Verbal report received from: Dayna Carroll RN   Procedure: Procedure(s):  COLONOSCOPY    Background:    Preoperative diagnosis: SCREENING COLONSCOPY  SCREENING FOR COLONIC NEOPLASIA  Postoperative diagnosis: 1.- Diverticulosis    :  Dr. Lambert Hunter   Assistant(s): Endoscopy Technician-1: Willy Goodman  Endoscopy RN-1: Dany Manzo RN    Specimens: * No specimens in log *  H. Pylori  no    Assessment:  Intra-procedure medications       Anesthesia gave intra-procedure sedation and medications, see anesthesia flow sheet yes    Intravenous fluids: NS@ KVO     Vital signs stable   yes    Abdominal assessment: round and soft   yes    Recommendation:  Discharge patient per MD order  yes.   Return to floor  outpatient  Family or Friend   Daughter   Permission to share finding with family or friend yes

## 2021-06-11 NOTE — PROGRESS NOTES

## 2021-06-11 NOTE — ANESTHESIA POSTPROCEDURE EVALUATION
Procedure(s):  COLONOSCOPY. MAC    Anesthesia Post Evaluation      Multimodal analgesia: multimodal analgesia not used between 6 hours prior to anesthesia start to PACU discharge  Patient location during evaluation: PACU  Patient participation: complete - patient participated  Level of consciousness: awake  Pain management: adequate  Airway patency: patent  Anesthetic complications: no  Cardiovascular status: acceptable, blood pressure returned to baseline and hemodynamically stable  Respiratory status: acceptable  Hydration status: acceptable  Post anesthesia nausea and vomiting:  controlled      INITIAL Post-op Vital signs:   Vitals Value Taken Time   /73 06/11/21 1113   Temp 36.7 °C (98 °F) 06/11/21 1103   Pulse 105 06/11/21 1116   Resp 28 06/11/21 1116   SpO2 100 % 06/11/21 1116   Vitals shown include unvalidated device data.

## 2021-06-14 ENCOUNTER — PATIENT OUTREACH (OUTPATIENT)
Dept: OTHER | Age: 51
End: 2021-06-14

## 2021-06-14 NOTE — PROGRESS NOTES
Patient on report as eligible for Case Management. Left discreet message on voicemail with this CM contact information. Will attempt to contact again to offer [address] [place] [address] Management services. Will reach out again on 6/16.

## 2021-06-16 ENCOUNTER — PATIENT OUTREACH (OUTPATIENT)
Dept: OTHER | Age: 51
End: 2021-06-16

## 2021-06-16 NOTE — LETTER
6/16/2021 9:31 AM 
 
Ms. Chayito Hannah [address] [facility] [address] Dear Ms. Hannah, My name is Jessica Lora, Associate Care Manager for [address] [place] [address] and I have been trying to reach you. The Associate Care Management (ACM) program is a free-of-charge confidential service provided to our associates and their family members covered by the [facility]. The program will provide an associate and his/her family with the [facility] expertise to assist in navigating the health care delivery system, provider services, and their overall care needsso as to assure and improve health care interactions and enhance the quality of life. This program is designed to provide you with the opportunity to have a [address] [place] [address] FOR CHILDREN partner with you for the following services: 
 
 1) when you come home from the hospital or emergency room 2) when help is needed to manage your disease 3) when you need assistance coordinating services or appointments 4) when you need additional education, resources or assistance reaching your Be Well Health Program goals/requirements such as Be Well With Diabetes [facility] is dedicated to empowering the good health of its community and improving the quality of care and care experiences for associates and their families. We are committed to safeguarding patient confidentiality and privacy, assuring that every associate has the respect he or she deserves in managing their health. The information shared with your care manager will not be shared with anyone else aside from those you identify as part of your care team, and will only be used to assist you with any identified care needs. Please contact me if you would like this service provided to you. Sincerely, MAREN Carranza  Associate Care Manager [address] [facility], [address] [facility] [phone]   [phone]  [email or web address] [email or web address] email: [email or web address]

## 2021-06-16 NOTE — PROGRESS NOTES
Patient identified as eligible for [address] [place] [address] services. Second telephone outreach attempted. Left discreet voicemail with this CM confidential contact information. Will send UTR letter.

## 2021-07-01 ENCOUNTER — TRANSCRIBE ORDER (OUTPATIENT)
Dept: SCHEDULING | Age: 51
End: 2021-07-01

## 2021-07-01 DIAGNOSIS — R06.02 SOB (SHORTNESS OF BREATH): ICD-10-CM

## 2021-07-01 DIAGNOSIS — G80.9 CP (CEREBRAL PALSY) (HCC): ICD-10-CM

## 2021-07-01 DIAGNOSIS — R06.02 SOB (SHORTNESS OF BREATH): Primary | ICD-10-CM

## 2021-07-01 DIAGNOSIS — R07.9 CHEST PAIN: Primary | ICD-10-CM

## 2021-07-14 ENCOUNTER — PATIENT OUTREACH (OUTPATIENT)
Dept: OTHER | Age: 51
End: 2021-07-14

## 2021-11-09 ENCOUNTER — DOCUMENTATION ONLY (OUTPATIENT)
Dept: PHARMACY | Age: 51
End: 2021-11-09

## 2021-11-09 NOTE — LETTER
Codie [address]  [address], [address]  Phone: toll free [phone] Option #3        [address]  [facility] [address]          Thanks so much for taking the first step towards better health. This letter is to inform you that we have received your enrollment form for the [facility] Be Well With Diabetes Program, but you are missing the following requirements or documentation:     1. Provider Visit for DM within the last 12 months  2. A1C Result within the last 12 months  3. Lipid Panel drawn yearly within the last 12 months  4. Urine Albumin test yearly within the last 12 months     To qualify for this program the above requirements must be met by 11/25/21 for enrollment into the program on 12/01/21. Results or visits obtained outside of [facility] will need to be provided by fax: [phone] or email: [email or web address] before the deadline in order to qualify for the program.     If requirements are not met by the date listed above, you will be not be enrolled in the program.     Codie 2  Phone: toll free [phone] Option #3  Email: [email or web address]  Fax Number: [phone]

## 2021-11-09 NOTE — PROGRESS NOTES
Pharmacy Pop Care Documentation:   Patient is missing the following requirements: Provider Documentation of DM Visit; A1C; Lipid Panel; Urine Albumin. If completed by 11/25/21 patient will be eligible for enrollment in the DM Program on 12/01/21. Application Received: via [employer]. Letter mailed to patient.     Sapna Schultz, [employer] [facility]   Department, toll free: [phone] Option #3

## 2022-03-19 PROBLEM — E66.01 OBESITY, MORBID (HCC): Status: ACTIVE | Noted: 2018-11-06

## 2022-03-19 PROBLEM — F32.A MILD DEPRESSION: Status: ACTIVE | Noted: 2018-11-06

## 2022-05-16 ENCOUNTER — TELEPHONE (OUTPATIENT)
Dept: PRIMARY CARE CLINIC | Age: 52
End: 2022-05-16

## 2022-05-16 RX ORDER — METHYLPREDNISOLONE 4 MG/1
TABLET ORAL
Qty: 1 DOSE PACK | Refills: 0 | Status: SHIPPED | OUTPATIENT
Start: 2022-05-16

## 2022-05-18 RX ORDER — METHYLPREDNISOLONE 4 MG/1
TABLET ORAL
Qty: 1 DOSE PACK | Refills: 0 | OUTPATIENT
Start: 2022-05-18

## 2022-06-21 ENCOUNTER — TRANSCRIBE ORDER (OUTPATIENT)
Dept: SCHEDULING | Age: 52
End: 2022-06-21

## 2022-10-10 ENCOUNTER — OFFICE VISIT (OUTPATIENT)
Dept: PRIMARY CARE CLINIC | Age: 52
End: 2022-10-10
Payer: COMMERCIAL

## 2022-10-10 DIAGNOSIS — M65.311 TRIGGER FINGER OF RIGHT THUMB: Primary | ICD-10-CM

## 2022-10-10 PROCEDURE — 99213 OFFICE O/P EST LOW 20 MIN: CPT | Performed by: FAMILY MEDICINE

## 2022-10-10 PROCEDURE — 96372 THER/PROPH/DIAG INJ SC/IM: CPT | Performed by: FAMILY MEDICINE

## 2022-10-10 NOTE — PROGRESS NOTES
Chayito Hannah (: 1970) is a 49 y.o. female, established patient, here for evaluation of the following chief complaint(s):  R thumb pain       ASSESSMENT/PLAN:  Below is the assessment and plan developed based on review of pertinent history, physical exam, labs, studies, and medications. 1. Trigger finger of right thumb  Acute  -     triamcinolone acetonide (KENALOG) 10 mg/mL injection 10 mg; 10 mg, Intra artICUlar, ONCE, 1 dose, On Mon 10/10/22 at 1300  Informed consent obtained. Skin thoroughly cleaned with alcohol swabs. Patient seated with right hand supinated. 10 mg triamcinolone mixed with 1/2 cc 2% lidocaine injected at the base of right thumb. There were no complications during this procedure. Patient tolerated procedure well without complications. Standard postprocedure care was explained and return precautions given. Return if symptoms worsen or fail to improve. SUBJECTIVE/OBJECTIVE:  HPI    Patient presenting for evaluation of right thumb pain. Onset of symptoms was 2-3 weeks ago. Patient describes right thumb pain. Pain is severe in severity. Patient has associated symptoms of pain, swelling. Patient does not have symptoms of bruising, warmth, bleeding, numbness, tingling, weakness. Treatment prior to arrival includes nothing. Allergies   Allergen Reactions    Other Food Hives and Shortness of Breath     Peanuts    Nuts [Tree Nut] Hives and Shortness of Breath    Aspirin Hives    Darvocet A500 [Propoxyphene N-Acetaminophen] Other (comments)     migraine    Imitrex [Sumatriptan Succinate] Palpitations    Sulfa (Sulfonamide Antibiotics) Hives     Current Outpatient Medications   Medication Sig    methylPREDNISolone (MEDROL DOSEPACK) 4 mg tablet 1 pack as per pack instructions    OTHER Mybetric 25mg every day    norethindrone (Incassia) 0.35 mg tab Take  by mouth. cetirizine HCl (ZYRTEC PO) Take  by mouth.     ondansetron (ZOFRAN ODT) 4 mg disintegrating tablet DISSOLVE 1 TABLET BY MOUTH EVERY 8 HOURS AS NEEDED FOR NAUSEA    lisinopril (PRINIVIL, ZESTRIL) 20 mg tablet TAKE 1 TABLET BY MOUTH EVERY DAY    buPROPion XL (WELLBUTRIN XL) 300 mg XL tablet TAKE 1 TABLET BY MOUTH EVERY MORNING (Patient taking differently: 150 mg.)    oxybutynin chloride XL (DITROPAN XL) 10 mg CR tablet TAKE 1 TABLET BY MOUTH EVERY DAY (Patient not taking: Reported on 2021)    metFORMIN (GLUCOPHAGE) 500 mg tablet TAKE 2 TABLETS BY MOUTH TWICE A DAY WITH MEALS    folic acid (FOLVITE) 1 mg tablet TAKE 2 TABLETS DAILY ON  THROUGH  AND 3 TABS DAILY ON  THROUGH     EPINEPHrine (EPIPEN) 0.3 mg/0.3 mL injection INJECT 1 PEN INTRAMUSCULARLY AS NEEDED (Patient not taking: Reported on 2021)    SITagliptin (JANUVIA) 100 mg tablet Take 1 Tab by mouth daily. methotrexate (RHEUMATREX) 2.5 mg tablet TAKE 20 MG (8 TABLETS) ONCE WEEKLY. hydroxychloroquine (PLAQUENIL) 200 mg tablet TAKE 1 TABLET BY MOUTH TWICE A DAY    naproxen sodium (ALEVE) 220 mg tablet Take 660 mg by mouth two (2) times daily (with meals). CHOLECALCIFEROL, VITAMIN D3, PO Take 5,000 Units by mouth daily. Current Facility-Administered Medications   Medication    triamcinolone acetonide (KENALOG) 10 mg/mL injection 10 mg     Past Medical History:   Diagnosis Date    Arthritis     Depression     Diabetes ([address]     Hypertension     Rheumatoid arthritis(714.0) 3/3/2015     Past Surgical History:   Procedure Laterality Date    COLONOSCOPY N/A 2021    COLONOSCOPY performed by Lucina Henning MD at [facility] ENDOSCOPY    HX  SECTION      HX HEMORRHOIDECTOMY      HX OTHER SURGICAL      Hemorrhoidectomy      Family History   Problem Relation Age of Onset    Hypertension Mother     OSTEOARTHRITIS Mother      Social History     Tobacco Use   Smoking Status Never   Smokeless Tobacco Never         Review of Systems   All other systems reviewed and are negative.       There were no vitals taken for this visit.   Physical Exam  Vitals reviewed. HENT:      Head: Normocephalic and atraumatic. Pulmonary:      Effort: Pulmonary effort is normal.   Musculoskeletal:      Cervical back: Neck supple. Comments: TTP R base of thumb, thumb catching with extension and flexion   Neurological:      Mental Status: She is alert. Sensory: Sensation is intact. Motor: Motor function is intact. On this date 10/10/2022 I have spent 20 minutes reviewing previous notes, test results and face to face with the patient discussing the diagnosis and importance of compliance with the treatment plan as well as documenting on the day of the visit. An electronic signature was used to authenticate this note.   -- Jhony Ochoa MD   [facility] [address]  [address]

## 2023-03-27 ENCOUNTER — OFFICE VISIT (OUTPATIENT)
Dept: PRIMARY CARE CLINIC | Age: 53
End: 2023-03-27
Payer: COMMERCIAL

## 2023-03-27 VITALS — HEART RATE: 78 BPM | SYSTOLIC BLOOD PRESSURE: 140 MMHG | DIASTOLIC BLOOD PRESSURE: 90 MMHG

## 2023-03-27 DIAGNOSIS — M65.311 TRIGGER FINGER OF RIGHT THUMB: Primary | ICD-10-CM

## 2023-03-27 PROCEDURE — 20552 NJX 1/MLT TRIGGER POINT 1/2: CPT | Performed by: FAMILY MEDICINE

## 2023-03-27 PROCEDURE — 99213 OFFICE O/P EST LOW 20 MIN: CPT | Performed by: FAMILY MEDICINE

## 2023-03-27 PROCEDURE — 3077F SYST BP >= 140 MM HG: CPT | Performed by: FAMILY MEDICINE

## 2023-03-27 PROCEDURE — 3080F DIAST BP >= 90 MM HG: CPT | Performed by: FAMILY MEDICINE

## 2023-03-27 NOTE — PROGRESS NOTES
Chayito Hannah (: 1970) is a 47 y.o. female, established patient, here for evaluation of the following chief complaint(s):  Thumb Pain (Injection in right thumb )       ASSESSMENT/PLAN:  Below is the assessment and plan developed based on review of pertinent history, physical exam, labs, studies, and medications. 1. Trigger finger of right thumb  Acute on chronic  -     INJECT TRIGGER POINT, 1 OR 2  -     triamcinolone acetonide (KENALOG) 10 mg/mL injection 10 mg; 10 mg, Intra artICUlar, ONCE, 1 dose, On Mon 3/27/23 at 1200  Informed consent obtained. Skin thoroughly cleaned with alcohol swabs. Patient seated with right hand supinated. 10 mg triamcinolone mixed with 1/2 cc 2% lidocaine without epinephrine injected at the base of right thumb. There were no complications during this procedure. Patient tolerated procedure well. Standard postprocedure care was explained and return precautions given. Return if symptoms worsen or fail to improve. SUBJECTIVE/OBJECTIVE:  HPI    52-year-old female past medical history hypertension and diabetes seen today for right thumb pain. Recent onset. Patient describes right thumb pain. Pain is moderate to severe in severity. Patient does not have symptoms with bruising, warmth, bleeding, numbness, tingling, weakness. Past treatment includes trigger finger injection into right thumb.   Patient reports injection was beneficial.      Allergies   Allergen Reactions    Other Food Hives and Shortness of Breath     Peanuts    Nuts [Tree Nut] Hives and Shortness of Breath    Aspirin Hives    Darvocet A500 [Propoxyphene N-Acetaminophen] Other (comments)     migraine    Imitrex [Sumatriptan Succinate] Palpitations    Sulfa (Sulfonamide Antibiotics) Hives     Current Outpatient Medications   Medication Sig    methylPREDNISolone (MEDROL DOSEPACK) 4 mg tablet 1 pack as per pack instructions    OTHER Mybetric 25mg every day    norethindrone (Incassia) 0.35 mg tab Take  by mouth. cetirizine HCl (ZYRTEC PO) Take  by mouth. ondansetron (ZOFRAN ODT) 4 mg disintegrating tablet DISSOLVE 1 TABLET BY MOUTH EVERY 8 HOURS AS NEEDED FOR NAUSEA    lisinopril (PRINIVIL, ZESTRIL) 20 mg tablet TAKE 1 TABLET BY MOUTH EVERY DAY    metFORMIN (GLUCOPHAGE) 500 mg tablet TAKE 2 TABLETS BY MOUTH TWICE A DAY WITH MEALS    folic acid (FOLVITE) 1 mg tablet TAKE 2 TABLETS DAILY ON  THROUGH  AND 3 TABS DAILY ON  THROUGH     SITagliptin (JANUVIA) 100 mg tablet Take 1 Tab by mouth daily. methotrexate (RHEUMATREX) 2.5 mg tablet TAKE 20 MG (8 TABLETS) ONCE WEEKLY. hydroxychloroquine (PLAQUENIL) 200 mg tablet TAKE 1 TABLET BY MOUTH TWICE A DAY    naproxen sodium (ALEVE) 220 mg tablet Take 660 mg by mouth two (2) times daily (with meals). CHOLECALCIFEROL, VITAMIN D3, PO Take 5,000 Units by mouth daily. Current Facility-Administered Medications   Medication    triamcinolone acetonide (KENALOG) 10 mg/mL injection 10 mg     Past Medical History:   Diagnosis Date    Arthritis     Depression     Diabetes ([facility] [address]     Hypertension     Rheumatoid arthritis(714.0) 3/3/2015     Past Surgical History:   Procedure Laterality Date    COLONOSCOPY N/A 2021    COLONOSCOPY performed by Say Monet MD at [facility] ENDOSCOPY    HX  SECTION      HX HEMORRHOIDECTOMY      HX OTHER SURGICAL      Hemorrhoidectomy      Family History   Problem Relation Age of Onset    Hypertension Mother     OSTEOARTHRITIS Mother      Social History     Tobacco Use   Smoking Status Never   Smokeless Tobacco Never         Review of Systems   All other systems reviewed and are negative. BP (!) 140/90 (BP 1 Location: Left lower arm)   Pulse 78    Physical Exam  Vitals reviewed. HENT:      Head: Normocephalic and atraumatic. Pulmonary:      Effort: Pulmonary effort is normal.   Musculoskeletal:      Cervical back: Neck supple.       Comments: TTP R base of thumb, thumb catching with extension and flexion   Neurological:      Mental Status: She is alert. Sensory: Sensation is intact. Motor: Motor function is intact. An electronic signature was used to authenticate this note.   -- Fransisco Anne MD   [facility] [address]  [address]

## 2023-03-30 ENCOUNTER — TRANSCRIBE ORDER (OUTPATIENT)
Dept: SCHEDULING | Age: 53
End: 2023-03-30

## 2023-03-30 DIAGNOSIS — I10 ESSENTIAL (PRIMARY) HYPERTENSION: Primary | ICD-10-CM

## 2023-04-14 ENCOUNTER — HOSPITAL ENCOUNTER (OUTPATIENT)
Dept: CT IMAGING | Age: 53
Discharge: HOME | End: 2023-04-14
Attending: FAMILY MEDICINE
Payer: COMMERCIAL

## 2023-04-14 DIAGNOSIS — R10.9 ABDOMINAL PAIN, UNSPECIFIED ABDOMINAL LOCATION: ICD-10-CM

## 2023-04-14 PROCEDURE — 74011000636 HC RX REV CODE- 636: Performed by: FAMILY MEDICINE

## 2023-04-14 PROCEDURE — 74160 CT ABDOMEN W/CONTRAST: CPT

## 2023-04-14 RX ADMIN — IOPAMIDOL 100 ML: 755 INJECTION, SOLUTION INTRAVENOUS at 15:48

## 2023-04-23 DIAGNOSIS — R13.10 DYSPHAGIA, UNSPECIFIED TYPE: Primary | ICD-10-CM

## 2023-04-23 DIAGNOSIS — R10.9 ABDOMINAL PAIN, UNSPECIFIED ABDOMINAL LOCATION: ICD-10-CM

## 2023-04-24 ENCOUNTER — HOSPITAL ENCOUNTER (OUTPATIENT)
Dept: NUCLEAR MEDICINE | Age: 53
Discharge: HOME | End: 2023-04-24
Attending: FAMILY MEDICINE
Payer: COMMERCIAL

## 2023-04-24 ENCOUNTER — HOSPITAL ENCOUNTER (OUTPATIENT)
Dept: NON INVASIVE DIAGNOSTICS | Age: 53
Discharge: HOME | End: 2023-04-24
Attending: FAMILY MEDICINE
Payer: COMMERCIAL

## 2023-04-24 VITALS
SYSTOLIC BLOOD PRESSURE: 114 MMHG | HEIGHT: 64 IN | DIASTOLIC BLOOD PRESSURE: 70 MMHG | BODY MASS INDEX: 50.02 KG/M2 | WEIGHT: 293 LBS

## 2023-04-24 DIAGNOSIS — I10 ESSENTIAL (PRIMARY) HYPERTENSION: ICD-10-CM

## 2023-04-24 PROCEDURE — 74011250636 HC RX REV CODE- 250/636: Performed by: SPECIALIST

## 2023-04-24 PROCEDURE — 78452 HT MUSCLE IMAGE SPECT MULT: CPT

## 2023-04-24 PROCEDURE — 93017 CV STRESS TEST TRACING ONLY: CPT

## 2023-04-24 RX ORDER — REGADENOSON 0.08 MG/ML
0.4 INJECTION, SOLUTION INTRAVENOUS
Status: COMPLETED | OUTPATIENT
Start: 2023-04-24 | End: 2023-04-24

## 2023-04-24 RX ORDER — TETRAKIS(2-METHOXYISOBUTYLISOCYANIDE)COPPER(I) TETRAFLUOROBORATE 1 MG/ML
32 INJECTION, POWDER, LYOPHILIZED, FOR SOLUTION INTRAVENOUS
Status: COMPLETED | OUTPATIENT
Start: 2023-04-24 | End: 2023-04-24

## 2023-04-24 RX ADMIN — TETRAKIS(2-METHOXYISOBUTYLISOCYANIDE)COPPER(I) TETRAFLUOROBORATE 32 MILLICURIE: 1 INJECTION, POWDER, LYOPHILIZED, FOR SOLUTION INTRAVENOUS at 09:50

## 2023-04-24 RX ADMIN — REGADENOSON 0.4 MG: 0.08 INJECTION, SOLUTION INTRAVENOUS at 09:34

## 2023-04-25 ENCOUNTER — HOSPITAL ENCOUNTER (OUTPATIENT)
Dept: GENERAL RADIOLOGY | Age: 53
Discharge: HOME | End: 2023-04-25
Attending: FAMILY MEDICINE
Payer: COMMERCIAL

## 2023-04-25 ENCOUNTER — HOSPITAL ENCOUNTER (OUTPATIENT)
Dept: NUCLEAR MEDICINE | Age: 53
Discharge: HOME | End: 2023-04-25
Attending: FAMILY MEDICINE
Payer: COMMERCIAL

## 2023-04-25 DIAGNOSIS — R10.9 ABDOMINAL PAIN, UNSPECIFIED ABDOMINAL LOCATION: ICD-10-CM

## 2023-04-25 DIAGNOSIS — R13.10 DYSPHAGIA, UNSPECIFIED TYPE: ICD-10-CM

## 2023-04-25 PROCEDURE — 92611 MOTION FLUOROSCOPY/SWALLOW: CPT | Performed by: SPEECH-LANGUAGE PATHOLOGIST

## 2023-04-25 PROCEDURE — 74230 X-RAY XM SWLNG FUNCJ C+: CPT

## 2023-04-25 RX ORDER — TETRAKIS(2-METHOXYISOBUTYLISOCYANIDE)COPPER(I) TETRAFLUOROBORATE 1 MG/ML
29 INJECTION, POWDER, LYOPHILIZED, FOR SOLUTION INTRAVENOUS
Status: COMPLETED | OUTPATIENT
Start: 2023-04-25 | End: 2023-04-25

## 2023-04-25 RX ADMIN — TETRAKIS(2-METHOXYISOBUTYLISOCYANIDE)COPPER(I) TETRAFLUOROBORATE 29 MILLICURIE: 1 INJECTION, POWDER, LYOPHILIZED, FOR SOLUTION INTRAVENOUS at 09:13

## 2023-04-26 LAB
STRESS BASELINE DIAS BP: 70 MMHG
STRESS BASELINE HR: 97 BPM
STRESS BASELINE ST DEPRESSION: 0 MM
STRESS BASELINE SYS BP: 114 MMHG
STRESS ESTIMATED WORKLOAD: 1 METS
STRESS EXERCISE DUR MIN: 3 MIN
STRESS EXERCISE DUR SEC: 0 SEC
STRESS PEAK DIAS BP: 78 MMHG
STRESS PEAK SYS BP: 129 MMHG
STRESS PERCENT HR ACHIEVED: 72 %
STRESS POST PEAK HR: 122 BPM
STRESS RATE PRESSURE PRODUCT: NORMAL BPM*MMHG
STRESS ST DEPRESSION: 0 MM
STRESS TARGET HR: 169 BPM

## 2023-06-28 ENCOUNTER — HOSPITAL ENCOUNTER (OUTPATIENT)
Facility: HOSPITAL | Age: 53
Setting detail: OUTPATIENT SURGERY
Discharge: HOME | End: 2023-06-28
Attending: SPECIALIST | Admitting: SPECIALIST
Payer: COMMERCIAL

## 2023-06-28 ENCOUNTER — ANESTHESIA EVENT (OUTPATIENT)
Facility: HOSPITAL | Age: 53
End: 2023-06-28
Payer: COMMERCIAL

## 2023-06-28 ENCOUNTER — ANESTHESIA (OUTPATIENT)
Facility: HOSPITAL | Age: 53
End: 2023-06-28
Payer: COMMERCIAL

## 2023-06-28 VITALS
HEIGHT: 64 IN | WEIGHT: 293 LBS | RESPIRATION RATE: 17 BRPM | OXYGEN SATURATION: 98 % | TEMPERATURE: 98 F | HEART RATE: 100 BPM | SYSTOLIC BLOOD PRESSURE: 119 MMHG | DIASTOLIC BLOOD PRESSURE: 67 MMHG | BODY MASS INDEX: 50.02 KG/M2

## 2023-06-28 LAB
GLUCOSE BLD STRIP.AUTO-MCNC: 109 MG/DL (ref 65–117)
SERVICE CMNT-IMP: NORMAL

## 2023-06-28 PROCEDURE — 3600007512: Performed by: SPECIALIST

## 2023-06-28 PROCEDURE — 3600007502: Performed by: SPECIALIST

## 2023-06-28 PROCEDURE — 6360000002 HC RX W HCPCS: Performed by: NURSE ANESTHETIST, CERTIFIED REGISTERED

## 2023-06-28 PROCEDURE — 3700000000 HC ANESTHESIA ATTENDED CARE: Performed by: SPECIALIST

## 2023-06-28 PROCEDURE — 2709999900 HC NON-CHARGEABLE SUPPLY: Performed by: SPECIALIST

## 2023-06-28 PROCEDURE — 7100000010 HC PHASE II RECOVERY - FIRST 15 MIN: Performed by: SPECIALIST

## 2023-06-28 PROCEDURE — 88305 TISSUE EXAM BY PATHOLOGIST: CPT

## 2023-06-28 PROCEDURE — 2580000003 HC RX 258: Performed by: SPECIALIST

## 2023-06-28 PROCEDURE — 3700000001 HC ADD 15 MINUTES (ANESTHESIA): Performed by: SPECIALIST

## 2023-06-28 PROCEDURE — 7100000011 HC PHASE II RECOVERY - ADDTL 15 MIN: Performed by: SPECIALIST

## 2023-06-28 PROCEDURE — 2500000003 HC RX 250 WO HCPCS: Performed by: NURSE ANESTHETIST, CERTIFIED REGISTERED

## 2023-06-28 PROCEDURE — 82962 GLUCOSE BLOOD TEST: CPT

## 2023-06-28 RX ORDER — ESCITALOPRAM OXALATE 10 MG/1
10 TABLET ORAL DAILY
COMMUNITY

## 2023-06-28 RX ORDER — PROPOFOL 10 MG/ML
INJECTION, EMULSION INTRAVENOUS CONTINUOUS PRN
Status: DISCONTINUED | OUTPATIENT
Start: 2023-06-28 | End: 2023-06-28 | Stop reason: SDUPTHER

## 2023-06-28 RX ORDER — BUPROPION HYDROCHLORIDE 300 MG/1
300 TABLET ORAL EVERY MORNING
COMMUNITY

## 2023-06-28 RX ORDER — LEVOCETIRIZINE DIHYDROCHLORIDE 5 MG/1
5 TABLET, FILM COATED ORAL NIGHTLY
COMMUNITY

## 2023-06-28 RX ORDER — SODIUM CHLORIDE 9 MG/ML
INJECTION, SOLUTION INTRAVENOUS CONTINUOUS
Status: DISCONTINUED | OUTPATIENT
Start: 2023-06-28 | End: 2023-06-28 | Stop reason: HOSPADM

## 2023-06-28 RX ORDER — DEXMEDETOMIDINE HYDROCHLORIDE 100 UG/ML
INJECTION, SOLUTION INTRAVENOUS PRN
Status: DISCONTINUED | OUTPATIENT
Start: 2023-06-28 | End: 2023-06-28 | Stop reason: SDUPTHER

## 2023-06-28 RX ORDER — SODIUM CHLORIDE 0.9 % (FLUSH) 0.9 %
5-40 SYRINGE (ML) INJECTION PRN
Status: DISCONTINUED | OUTPATIENT
Start: 2023-06-28 | End: 2023-06-28 | Stop reason: HOSPADM

## 2023-06-28 RX ORDER — ERGOCALCIFEROL 1.25 MG/1
50000 CAPSULE ORAL WEEKLY
COMMUNITY

## 2023-06-28 RX ADMIN — DEXMEDETOMIDINE 8 MCG: 100 INJECTION, SOLUTION INTRAVENOUS at 13:10

## 2023-06-28 RX ADMIN — PROPOFOL 400 MCG/KG/MIN: 10 INJECTION, EMULSION INTRAVENOUS at 13:08

## 2023-06-28 RX ADMIN — SODIUM CHLORIDE: 9 INJECTION, SOLUTION INTRAVENOUS at 13:05

## 2023-06-28 ASSESSMENT — PAIN - FUNCTIONAL ASSESSMENT: PAIN_FUNCTIONAL_ASSESSMENT: 0-10

## 2023-07-26 RX ORDER — METHYLPREDNISOLONE 4 MG/1
TABLET ORAL
OUTPATIENT
Start: 2023-07-26

## 2023-08-03 ENCOUNTER — OFFICE VISIT (OUTPATIENT)
Dept: PRIMARY CARE CLINIC | Facility: CLINIC | Age: 53
End: 2023-08-03
Payer: COMMERCIAL

## 2023-08-03 VITALS
BODY MASS INDEX: 50.02 KG/M2 | HEIGHT: 64 IN | RESPIRATION RATE: 16 BRPM | SYSTOLIC BLOOD PRESSURE: 113 MMHG | TEMPERATURE: 98.4 F | DIASTOLIC BLOOD PRESSURE: 88 MMHG | HEART RATE: 93 BPM | OXYGEN SATURATION: 98 % | WEIGHT: 293 LBS

## 2023-08-03 DIAGNOSIS — M05.79 SEROPOSITIVE RHEUMATOID ARTHRITIS OF MULTIPLE SITES (HCC): Primary | ICD-10-CM

## 2023-08-03 LAB — HBA1C MFR BLD HPLC: 8.1 %

## 2023-08-03 PROCEDURE — 99213 OFFICE O/P EST LOW 20 MIN: CPT | Performed by: FAMILY MEDICINE

## 2023-08-03 PROCEDURE — 3079F DIAST BP 80-89 MM HG: CPT | Performed by: FAMILY MEDICINE

## 2023-08-03 PROCEDURE — 3074F SYST BP LT 130 MM HG: CPT | Performed by: FAMILY MEDICINE

## 2023-08-03 RX ORDER — METHYLPREDNISOLONE 4 MG/1
TABLET ORAL
Qty: 1 KIT | Refills: 0 | Status: SHIPPED | OUTPATIENT
Start: 2023-08-03 | End: 2023-08-09

## 2023-08-03 ASSESSMENT — PATIENT HEALTH QUESTIONNAIRE - PHQ9
7. TROUBLE CONCENTRATING ON THINGS, SUCH AS READING THE NEWSPAPER OR WATCHING TELEVISION: 0
SUM OF ALL RESPONSES TO PHQ QUESTIONS 1-9: 3
2. FEELING DOWN, DEPRESSED OR HOPELESS: 0
1. LITTLE INTEREST OR PLEASURE IN DOING THINGS: 0
1. LITTLE INTEREST OR PLEASURE IN DOING THINGS: 3
SUM OF ALL RESPONSES TO PHQ QUESTIONS 1-9: 3
SUM OF ALL RESPONSES TO PHQ9 QUESTIONS 1 & 2: 3
SUM OF ALL RESPONSES TO PHQ QUESTIONS 1-9: 3
3. TROUBLE FALLING OR STAYING ASLEEP: 0
SUM OF ALL RESPONSES TO PHQ QUESTIONS 1-9: 3
5. POOR APPETITE OR OVEREATING: 0
9. THOUGHTS THAT YOU WOULD BE BETTER OFF DEAD, OR OF HURTING YOURSELF: 0
4. FEELING TIRED OR HAVING LITTLE ENERGY: 0
10. IF YOU CHECKED OFF ANY PROBLEMS, HOW DIFFICULT HAVE THESE PROBLEMS MADE IT FOR YOU TO DO YOUR WORK, TAKE CARE OF THINGS AT HOME, OR GET ALONG WITH OTHER PEOPLE: 0
6. FEELING BAD ABOUT YOURSELF - OR THAT YOU ARE A FAILURE OR HAVE LET YOURSELF OR YOUR FAMILY DOWN: 0
SUM OF ALL RESPONSES TO PHQ QUESTIONS 1-9: 0
8. MOVING OR SPEAKING SO SLOWLY THAT OTHER PEOPLE COULD HAVE NOTICED. OR THE OPPOSITE, BEING SO FIGETY OR RESTLESS THAT YOU HAVE BEEN MOVING AROUND A LOT MORE THAN USUAL: 0
SUM OF ALL RESPONSES TO PHQ9 QUESTIONS 1 & 2: 0
SUM OF ALL RESPONSES TO PHQ QUESTIONS 1-9: 0
2. FEELING DOWN, DEPRESSED OR HOPELESS: 0

## 2023-08-03 NOTE — PROGRESS NOTES
Aida Sanchez (: 1970) is a 47 y.o. female, established patient, here for evaluation of the following chief complaint(s): Other (RA flare)       ASSESSMENT/PLAN:  Below is the assessment and plan developed based on review of pertinent history, physical exam, labs, studies, and medications. 1. Seropositive rheumatoid arthritis of multiple sites ([address])  -     methylPREDNISolone (MEDROL DOSEPACK) 4 MG tablet; Take as directed on pack. , Disp-1 kit, R-0Normal  Moderate flare, treated with Medrol Dosepak, continue DMARDs. Continue NSAIDs. Return to office if no improvement. Return if symptoms worsen or fail to improve. SUBJECTIVE/OBJECTIVE:  HPI    50-year-old female past medical history notable for seropositive RA on methotrexate and hydroxychloroquine presents to the clinic for RA flare. Patient reports experiencing pain in her hands which have been swollen for the past 2 weeks. She also reports a new nodule on her right fourth digit that appeared last week. The patient denies any fever or itchiness but mentions that the nodule was warm to touch initially. She has been on hydroxychloroquine and methotrexate for her RA. She also reports that she usually experiences relief from her flareups after 3 days but this time it has persisted for 2 weeks. Allergies   Allergen Reactions    Macadamia Nut Oil Hives and Shortness Of Breath    Other Hives and Shortness Of Breath     Peanuts    Aspirin Hives    Sulfa Antibiotics Hives    Sumatriptan Palpitations     Current Outpatient Medications   Medication Sig Dispense Refill    methylPREDNISolone (MEDROL DOSEPACK) 4 MG tablet Take as directed on pack.  1 kit 0    buPROPion (WELLBUTRIN XL) 300 MG extended release tablet Take 1 tablet by mouth every morning      escitalopram (LEXAPRO) 10 MG tablet Take 1 tablet by mouth daily      vitamin D (ERGOCALCIFEROL) 1.25 MG (59498 UT) CAPS capsule Take 1 capsule by mouth once a week      dulaglutide

## 2023-09-07 ENCOUNTER — TELEPHONE (OUTPATIENT)
Dept: PRIMARY CARE CLINIC | Facility: CLINIC | Age: 53
End: 2023-09-07

## 2023-09-07 RX ORDER — METHYLPREDNISOLONE 4 MG/1
TABLET ORAL
Qty: 1 KIT | Refills: 2 | Status: SHIPPED | OUTPATIENT
Start: 2023-09-07 | End: 2023-09-13

## 2023-09-15 ENCOUNTER — HOSPITAL ENCOUNTER (OUTPATIENT)
Facility: HOSPITAL | Age: 53
Discharge: HOME | End: 2023-09-15
Attending: FAMILY MEDICINE
Payer: COMMERCIAL

## 2023-09-15 DIAGNOSIS — R13.10 DYSPHAGIA, UNSPECIFIED TYPE: ICD-10-CM

## 2023-09-15 PROCEDURE — 78264 GASTRIC EMPTYING IMG STUDY: CPT

## 2023-09-15 PROCEDURE — 3430000000 HC RX DIAGNOSTIC RADIOPHARMACEUTICAL: Performed by: FAMILY MEDICINE

## 2023-09-15 PROCEDURE — A9541 TC99M SULFUR COLLOID: HCPCS | Performed by: FAMILY MEDICINE

## 2023-09-15 RX ORDER — TECHNETIUM TC 99M SULFUR COLLOID 2 MG
0.3 KIT MISCELLANEOUS
Status: COMPLETED | OUTPATIENT
Start: 2023-09-15 | End: 2023-09-15

## 2023-09-15 RX ADMIN — TECHNETIUM TC 99M SULFUR COLLOID 0.3 MILLICURIE: KIT at 11:50

## 2023-11-03 ENCOUNTER — TRANSCRIBE ORDERS (OUTPATIENT)
Facility: HOSPITAL | Age: 53
End: 2023-11-03

## 2023-11-03 ENCOUNTER — HOSPITAL ENCOUNTER (OUTPATIENT)
Facility: HOSPITAL | Age: 53
Discharge: HOME | End: 2023-11-03
Payer: COMMERCIAL

## 2023-11-03 DIAGNOSIS — R52 RADIATING PAIN: Chronic | ICD-10-CM

## 2023-11-03 DIAGNOSIS — A15.9 TB (TUBERCULOSIS): ICD-10-CM

## 2023-11-03 DIAGNOSIS — R52 PAIN: ICD-10-CM

## 2023-11-03 DIAGNOSIS — A15.9 TB (TUBERCULOSIS): Primary | ICD-10-CM

## 2023-11-03 DIAGNOSIS — R52 PAIN: Primary | ICD-10-CM

## 2023-11-03 DIAGNOSIS — R52 RADIATING PAIN: Primary | ICD-10-CM

## 2023-11-03 DIAGNOSIS — R52 RADIATING PAIN: ICD-10-CM

## 2023-11-03 PROCEDURE — 71046 X-RAY EXAM CHEST 2 VIEWS: CPT

## 2023-11-03 PROCEDURE — 73110 X-RAY EXAM OF WRIST: CPT

## 2023-11-03 PROCEDURE — 73130 X-RAY EXAM OF HAND: CPT

## 2023-11-08 ENCOUNTER — TELEPHONE (OUTPATIENT)
Facility: HOSPITAL | Age: 53
End: 2023-11-08

## 2023-11-08 ENCOUNTER — ENROLLMENT (OUTPATIENT)
Facility: HOSPITAL | Age: 53
End: 2023-11-08

## 2023-11-08 NOTE — TELEPHONE ENCOUNTER
Specialty Medication Service    Date: 11/8/2023  Patient's Name: Mayra Louis YOB: 1970            _____________________________________________________________________________________________    Email received from [address] in regard to current SMS formulary medication, Enbrel. Initial SMS override placed. Patient to be transferred for initial assessment once medication shipment is scheduled. Called specialist office (Vandana Rosales) to request office notes.       Ravinder Keyes CPhT  Pharmacy   Specialty Medication Services   Phone: [phone] option 4    For Pharmacy Admin Tracking Only    Program: SMS  CPA in place:  No  Recommendation Provided To: Provider: 1 via Called provider office and Pharmacy: 1  Intervention Detail: Benefit Assistance  Intervention Accepted By: Provider: 0 and Pharmacy: 1  Gap Closed?:    Time Spent (min): 15

## 2023-11-09 ENCOUNTER — TELEPHONE (OUTPATIENT)
Facility: HOSPITAL | Age: 53
End: 2023-11-09

## 2023-11-09 NOTE — TELEPHONE ENCOUNTER
Specialty Medication Service    Date: 11/9/2023  Patient's Name: Gudelia Marin YOB: 1970            _____________________________________________________________________________________________    Patient transferred from [facility] to schedule PharmD initial appointment for Specialty Medication Services.  Patient scheduled 11/13/23 at 7:30 am.      Marcie Murphy CPhT  Pharmacy   Specialty Medication Services   Phone: [phone] option [address] Only    Program: BERNARD  CPA in place:  No  Recommendation Provided To: Patient/Caregiver: 1 via Telephone  Intervention Detail: Scheduled Appointment  Intervention Accepted By: Patient/Caregiver: 1  Gap Closed?:    Time Spent (min): 15

## 2023-11-10 RX ORDER — ESCITALOPRAM OXALATE 20 MG/1
TABLET ORAL
COMMUNITY
Start: 2023-08-11

## 2023-11-10 RX ORDER — ONDANSETRON HYDROCHLORIDE 8 MG/1
8 TABLET, FILM COATED ORAL EVERY 8 HOURS PRN
COMMUNITY
Start: 2023-10-12 | End: 2023-11-13

## 2023-11-10 RX ORDER — LOSARTAN POTASSIUM 50 MG/1
TABLET ORAL
COMMUNITY
Start: 2023-09-25

## 2023-11-10 RX ORDER — EMPAGLIFLOZIN 25 MG/1
TABLET, FILM COATED ORAL
COMMUNITY
Start: 2023-10-31

## 2023-11-13 ENCOUNTER — PHARMACY VISIT (OUTPATIENT)
Facility: HOSPITAL | Age: 53
End: 2023-11-13

## 2023-11-13 DIAGNOSIS — M05.79 SEROPOSITIVE RHEUMATOID ARTHRITIS OF MULTIPLE SITES (HCC): Primary | ICD-10-CM

## 2023-11-13 RX ORDER — ACETAMINOPHEN AND CODEINE PHOSPHATE 120; 12 MG/5ML; MG/5ML
1 SOLUTION ORAL DAILY
COMMUNITY

## 2023-11-13 RX ORDER — ONDANSETRON 8 MG/1
8 TABLET, ORALLY DISINTEGRATING ORAL EVERY 8 HOURS PRN
COMMUNITY

## 2023-11-13 ASSESSMENT — PATIENT HEALTH QUESTIONNAIRE - PHQ9
SUM OF ALL RESPONSES TO PHQ9 QUESTIONS 1 & 2: 2
SUM OF ALL RESPONSES TO PHQ QUESTIONS 1-9: 2
2. FEELING DOWN, DEPRESSED OR HOPELESS: 1
1. LITTLE INTEREST OR PLEASURE IN DOING THINGS: 1
SUM OF ALL RESPONSES TO PHQ QUESTIONS 1-9: 2

## 2023-11-13 ASSESSMENT — PROMIS GLOBAL HEALTH SCALE
SUM OF RESPONSES TO QUESTIONS 2, 4, 5, & 10: 10
TO WHAT EXTENT ARE YOU ABLE TO CARRY OUT YOUR EVERYDAY PHYSICAL ACTIVITIES SUCH AS WALKING, CLIMBING STAIRS, CARRYING GROCERIES, OR MOVING A CHAIR [ON A SCALE OF 1 (NOT AT ALL) TO 5 (COMPLETELY)]?: 4
IN GENERAL, WOULD YOU SAY YOUR HEALTH IS...[ON A SCALE OF 1 (POOR) TO 5 (EXCELLENT)]: 2
SUM OF RESPONSES TO QUESTIONS 3, 6, 7, & 8: 15
IN GENERAL, HOW WOULD YOU RATE YOUR SATISFACTION WITH YOUR SOCIAL ACTIVITIES AND RELATIONSHIPS [ON A SCALE OF 1 (POOR) TO 5 (EXCELLENT)]?: 2
IN THE PAST 7 DAYS, HOW WOULD YOU RATE YOUR PAIN ON AVERAGE [ON A SCALE FROM 0 (NO PAIN) TO 10 (WORST IMAGINABLE PAIN)]?: 6
IN GENERAL, HOW WOULD YOU RATE YOUR MENTAL HEALTH, INCLUDING YOUR MOOD AND YOUR ABILITY TO THINK [ON A SCALE OF 1 (POOR) TO 5 (EXCELLENT)]?: 3
IN GENERAL, PLEASE RATE HOW WELL YOU CARRY OUT YOUR USUAL SOCIAL ACTIVITIES (INCLUDES ACTIVITIES AT HOME, AT WORK, AND IN YOUR COMMUNITY, AND RESPONSIBILITIES AS A PARENT, CHILD, SPOUSE, EMPLOYEE, FRIEND, ETC) [ON A SCALE OF 1 (POOR) TO 5 (EXCELLENT)]?: 4
IN THE PAST 7 DAYS, HOW OFTEN HAVE YOU BEEN BOTHERED BY EMOTIONAL PROBLEMS, SUCH AS FEELING ANXIOUS, DEPRESSED, OR IRRITABLE [ON A SCALE FROM 1 (NEVER) TO 5 (ALWAYS)]?: 3
IN GENERAL, HOW WOULD YOU RATE YOUR PHYSICAL HEALTH [ON A SCALE OF 1 (POOR) TO 5 (EXCELLENT)]?: 2
IN GENERAL, WOULD YOU SAY YOUR QUALITY OF LIFE IS...[ON A SCALE OF 1 (POOR) TO 5 (EXCELLENT)]: 2
IN THE PAST 7 DAYS, HOW WOULD YOU RATE YOUR FATIGUE ON AVERAGE [ON A SCALE FROM 1 (NONE) TO 5 (VERY SEVERE)]?: 3

## 2023-11-21 ENCOUNTER — PHARMACY VISIT (OUTPATIENT)
Facility: HOSPITAL | Age: 53
End: 2023-11-21

## 2023-11-21 ENCOUNTER — TELEPHONE (OUTPATIENT)
Facility: HOSPITAL | Age: 53
End: 2023-11-21

## 2023-11-21 DIAGNOSIS — M05.79 SEROPOSITIVE RHEUMATOID ARTHRITIS OF MULTIPLE SITES (HCC): Primary | ICD-10-CM

## 2023-11-21 NOTE — TELEPHONE ENCOUNTER
Specialty Medication Service    Date: 11/21/2023  Patient's Name: Beatris Reno YOB: 1970            _____________________________________________________________________________________________    Reached patient to [facility] Medical Director  appointment for Specialty Medication Services.  Patient scheduled 11/21/23      Huey Robison PharmD [facility]  Ambulatory Clinical Pharmacist  Specialty Medication Services  Phone: [phone] option #4  Fax: [phone]    For Pharmacy Admin Tracking Only    Program: SMS  CPA in place:  No  Recommendation Provided To: Patient/Caregiver: 1 via Telephone  Intervention Detail: Scheduled Appointment  Intervention Accepted By: Patient/Caregiver: 1    Time Spent (min): 5

## 2023-11-21 NOTE — PROGRESS NOTES
Specialty Medication Follow up Virtual Visit  [facility]  [place]  [address] [address] [address]  Dept: [phone]  Loc: [phone]  Date of patient's visit: 11/21/2023  Patient's Name:  Chang Tse YOB: 1970            Patient Care Team:  Vahid Perez MD as PCP - Freda Amaral MD as PCP - Empaneled Provider  ================================================================    REASON FOR VISIT/CHIEF COMPLAINT:  No chief complaint on file. HISTORY OF PRESENTING ILLNESS:  Chang Tse is 47 y.o. is here for a follow up virtual visit for specialty medication. Specialty Medication: enbrel  Frequency: weekly  Indication: RA  Initially Diagnosed:   Specialist: Vicky   Last Specialist Visit: 11/1/23  Side effects includes: none   Last visit with me was: NA   Chang Tse has no new complain today. DIAGNOSTIC FINDINGS:  CBC:No results found for: \"WBC\", \"HGB\", \"PLT\"    BMP:  No results found for: \"NA\", \"K\", \"CL\", \"CO2\", \"BUN\", \"CREATININE\", \"GLUCOSE\"    HEMOGLOBIN A1C: No results found for: \"LABA1C\"    FASTING LIPID PANEL:No results found for: \"CHOL\", \"HDL\", \"TRIG\"    PHYSICAL EXAM:  There were no vitals filed for this visit. BP Readings from Last 3 Encounters:   08/03/23 113/88   06/28/23 119/67   03/27/23 (!) 140/90          ASSESSMENT AND PLAN:  Diagnoses and all orders for this visit:    Seropositive rheumatoid arthritis of multiple sites ([address])      FOLLOW UP AND INSTRUCTIONS:  Given the patient's condition, the patient should continue with the current care provided by the pharmacist.  Follow up with in 6 months. Carito Ferrari received counseling on the following healthy behaviors: medication adherence    Discussed use, benefit, and side effects of prescribed medications. Barriers to medication compliance addressed. All patient questions answered. Pt voiced understanding.         11/21/2023, 12:29

## 2023-12-08 DIAGNOSIS — M05.79 SEROPOSITIVE RHEUMATOID ARTHRITIS OF MULTIPLE SITES (HCC): Primary | ICD-10-CM

## 2023-12-08 NOTE — TELEPHONE ENCOUNTER
CLINICAL PHARMACY NOTE - SPECIALTY MEDICATION SERVICE      Edy Whitten is a 47 y.o.  female enrolled in the Specialty Medication Service. Patient does have a signed CPA on file. Chart reviewed and patient is compliant with [facility] program requirements. Labs unable to assess, however patient stated during visit with [facility] labs were recently obtained and WNL; No significant concerns noted. Next [facility] visit scheduled/anticipated for 05/06/2023. Will approve refill for 30 day supply per original specialist's order.       Orders Placed This Encounter    Etanercept 50 MG/ML SOAJ     Sig: Inject 50 mg (1 auto-injector) under the skin weekly     Dispense:  4 mL     Refill:  0          Una Gonsales PharmD [address]  Ambulatory Clinical Pharmacist  Specialty Medication Services  Phone: [phone] option #4  Fax: [phone]     For Pharmacy Admin Tracking Only    Program: [facility]  CPA in place:  Yes  Recommendation Provided To: Patient/Caregiver: 1 via Telephone  Intervention Detail: Refill(s) Provided  Intervention Accepted By: Patient/Caregiver: 1    Time Spent (min): 15

## 2023-12-08 NOTE — TELEPHONE ENCOUNTER
Specialty Medication Service    Date: 12/8/2023  Patient's Name: Dulce Degroot YOB: 1970            _____________________________________________________________________________________________    Dulce Degroot is a 47 y.o. female enrolled in the Specialty Medication Service. We received a refill request for Enbrel on 12/8/2023. Original Rx was written for 1+1 fills on 11/3/2023.      Thank you,    Huma Heard [facility]      Requested Prescriptions     Pending Prescriptions Disp Refills    Etanercept 50 MG/ML SOAJ 4 mL 1     Sig: Inject 50 mg into the skin once a week Inject 50 mg (1 auto-injector) under the skin weekly

## 2024-01-08 DIAGNOSIS — M05.79 SEROPOSITIVE RHEUMATOID ARTHRITIS OF MULTIPLE SITES (HCC): Primary | ICD-10-CM

## 2024-01-08 RX ORDER — MEDROXYPROGESTERONE ACETATE 150 MG/ML
INJECTION, SUSPENSION INTRAMUSCULAR
Qty: 4 ML | Refills: 0 | OUTPATIENT
Start: 2024-01-08

## 2024-01-26 ENCOUNTER — HOSPITAL ENCOUNTER (OUTPATIENT)
Facility: HOSPITAL | Age: 54
Discharge: HOME | End: 2024-01-26
Attending: FAMILY MEDICINE
Payer: COMMERCIAL

## 2024-01-26 DIAGNOSIS — R91.1 LUNG NODULE: ICD-10-CM

## 2024-01-26 PROCEDURE — 71250 CT THORAX DX C-: CPT

## 2024-02-01 ENCOUNTER — TELEPHONE (OUTPATIENT)
Age: 54
End: 2024-02-01

## 2024-02-08 ENCOUNTER — TELEPHONE (OUTPATIENT)
Facility: HOSPITAL | Age: 54
End: 2024-02-08

## 2024-02-08 DIAGNOSIS — M05.79 SEROPOSITIVE RHEUMATOID ARTHRITIS OF MULTIPLE SITES (HCC): Primary | ICD-10-CM

## 2024-02-08 NOTE — TELEPHONE ENCOUNTER
CLINICAL PHARMACY NOTE - SPECIALTY MEDICATION SERVICE      Aida Sanchez is a 53 y.o.  female enrolled in the Specialty Medication Service. Patient does have a signed CPA on file.      Chart reviewed and patient is compliant with [facility] program requirements. Labs are all WNL per patient (office will not share information with us); No significant concerns noted. Next SMS visit scheduled/anticipated for 05/06/2024.  Sent patient a request for Release of Information today as well so that we can attempt to get office records moving forward.     Will approve refill for 90 day supply per original specialist's order.      Orders Placed This Encounter    Etanercept 50 MG/ML SOAJ     Sig: Inject 50 mg (1 auto-injector) under the skin weekly     Dispense:  12 mL     Refill:  0          James Humphrey PharmD [facility]  Ambulatory Clinical Pharmacist  Specialty Medication Services  Phone: [phone] option #4  Fax: [phone]     For Pharmacy Admin Tracking Only    Program: [facility]  CPA in place:  Yes  Recommendation Provided To: Patient/Caregiver: 1 via Telephone  Intervention Detail: Refill(s) Provided  Intervention Accepted By: Patient/Caregiver: 1    Time Spent (min): 15

## 2024-02-08 NOTE — TELEPHONE ENCOUNTER
Specialty Medication Service    Date: 2/8/2024  Patient's Name: Aida Sanchez YOB: 1970            _____________________________________________________________________________________________    Aida Sanchez is a 53 y.o. female enrolled in the Specialty Medication Service.     We received a refill request for Enbrel on 02/08/24.     Original Rx was written for 1 fills on 02/01/24.     Thank you,    Shayna Levy      Requested Prescriptions     Pending Prescriptions Disp Refills    Etanercept 50 MG/ML SOAJ 12 mL 0     Sig: Inject 50 mg (1 auto-injector) under the skin weekly

## 2024-02-09 ENCOUNTER — APPOINTMENT (OUTPATIENT)
Facility: HOSPITAL | Age: 54
End: 2024-02-09
Payer: COMMERCIAL

## 2024-02-09 ENCOUNTER — HOSPITAL ENCOUNTER (EMERGENCY)
Facility: HOSPITAL | Age: 54
Discharge: HOME | End: 2024-02-09
Attending: STUDENT IN AN ORGANIZED HEALTH CARE EDUCATION/TRAINING PROGRAM
Payer: COMMERCIAL

## 2024-02-09 VITALS
RESPIRATION RATE: 23 BRPM | HEART RATE: 105 BPM | WEIGHT: 293 LBS | DIASTOLIC BLOOD PRESSURE: 80 MMHG | OXYGEN SATURATION: 92 % | SYSTOLIC BLOOD PRESSURE: 133 MMHG | TEMPERATURE: 98.3 F | BODY MASS INDEX: 50.02 KG/M2 | HEIGHT: 64 IN

## 2024-02-09 DIAGNOSIS — T78.40XA ALLERGIC REACTION, INITIAL ENCOUNTER: ICD-10-CM

## 2024-02-09 DIAGNOSIS — R06.02 SHORTNESS OF BREATH: Primary | ICD-10-CM

## 2024-02-09 LAB
ALBUMIN SERPL-MCNC: 4.1 G/DL (ref 3.5–5.2)
ALBUMIN/GLOB SERPL: 1.1 (ref 1.1–2.2)
ALP SERPL-CCNC: 138 U/L (ref 35–104)
ALT SERPL-CCNC: 16 U/L (ref 10–35)
ANION GAP SERPL CALC-SCNC: 16 MMOL/L (ref 5–15)
AST SERPL-CCNC: 21 U/L (ref 10–35)
BASOPHILS # BLD: 0.1 K/UL (ref 0–1)
BASOPHILS NFR BLD: 1 % (ref 0–1)
BILIRUB SERPL-MCNC: 0.2 MG/DL (ref 0.2–1)
BUN SERPL-MCNC: 16 MG/DL (ref 6–20)
BUN/CREAT SERPL: 27 (ref 12–20)
CALCIUM SERPL-MCNC: 8.9 MG/DL (ref 8.6–10)
CHLORIDE SERPL-SCNC: 100 MMOL/L (ref 98–107)
CO2 SERPL-SCNC: 22 MMOL/L (ref 22–29)
COMMENT:: NORMAL
CREAT SERPL-MCNC: 0.6 MG/DL (ref 0.5–0.9)
D DIMER PPP FEU-MCNC: 0.96 UG/ML(FEU)
DIFFERENTIAL METHOD BLD: ABNORMAL
EOSINOPHIL # BLD: 0.2 K/UL (ref 0–0.4)
EOSINOPHIL NFR BLD: 2 %
ERYTHROCYTE [DISTWIDTH] IN BLOOD BY AUTOMATED COUNT: 14.8 % (ref 11.5–14.5)
GLOBULIN SER CALC-MCNC: 3.7 G/DL (ref 2–4)
GLUCOSE SERPL-MCNC: 235 MG/DL (ref 65–100)
HCT VFR BLD AUTO: 44.9 % (ref 35–47)
HGB BLD-MCNC: 15 G/DL (ref 11.5–16)
IMM GRANULOCYTES # BLD AUTO: 0.1 K/UL (ref 0–0.04)
IMM GRANULOCYTES NFR BLD AUTO: 1 % (ref 0–0.5)
LYMPHOCYTES # BLD: 3.9 K/UL (ref 0.8–3.5)
LYMPHOCYTES NFR BLD: 31 % (ref 12–49)
MCH RBC QN AUTO: 30.3 PG (ref 26–34)
MCHC RBC AUTO-ENTMCNC: 33.4 G/DL (ref 30–36.5)
MCV RBC AUTO: 90.7 FL (ref 80–99)
MONOCYTES # BLD: 1.4 K/UL (ref 0–1)
MONOCYTES NFR BLD: 12 % (ref 5–13)
NEUTS SEG # BLD: 6.7 K/UL (ref 1.8–8)
NEUTS SEG NFR BLD: 53 % (ref 32–75)
NRBC # BLD: 0 K/UL (ref 0–0.01)
NRBC BLD-RTO: 0 PER 100 WBC
PLATELET # BLD AUTO: 316 K/UL (ref 150–400)
PMV BLD AUTO: 11.8 FL (ref 8.9–12.9)
POTASSIUM SERPL-SCNC: 4.3 MMOL/L (ref 3.5–5.1)
PROT SERPL-MCNC: 7.8 G/DL (ref 6.4–8.3)
RBC # BLD AUTO: 4.95 M/UL (ref 3.8–5.2)
SODIUM SERPL-SCNC: 138 MMOL/L (ref 136–145)
SPECIMEN HOLD: NORMAL
TROPONIN I BLD-MCNC: <0.04 NG/ML (ref 0–0.08)
WBC # BLD AUTO: 12.3 K/UL (ref 3.6–11)

## 2024-02-09 PROCEDURE — A4216 STERILE WATER/SALINE, 10 ML: HCPCS | Performed by: STUDENT IN AN ORGANIZED HEALTH CARE EDUCATION/TRAINING PROGRAM

## 2024-02-09 PROCEDURE — 96376 TX/PRO/DX INJ SAME DRUG ADON: CPT

## 2024-02-09 PROCEDURE — 71275 CT ANGIOGRAPHY CHEST: CPT

## 2024-02-09 PROCEDURE — 84484 ASSAY OF TROPONIN QUANT: CPT

## 2024-02-09 PROCEDURE — 6360000002 HC RX W HCPCS: Performed by: STUDENT IN AN ORGANIZED HEALTH CARE EDUCATION/TRAINING PROGRAM

## 2024-02-09 PROCEDURE — 2580000003 HC RX 258: Performed by: STUDENT IN AN ORGANIZED HEALTH CARE EDUCATION/TRAINING PROGRAM

## 2024-02-09 PROCEDURE — 96374 THER/PROPH/DIAG INJ IV PUSH: CPT

## 2024-02-09 PROCEDURE — 2500000003 HC RX 250 WO HCPCS: Performed by: STUDENT IN AN ORGANIZED HEALTH CARE EDUCATION/TRAINING PROGRAM

## 2024-02-09 PROCEDURE — 6360000004 HC RX CONTRAST MEDICATION: Performed by: STUDENT IN AN ORGANIZED HEALTH CARE EDUCATION/TRAINING PROGRAM

## 2024-02-09 PROCEDURE — 6370000000 HC RX 637 (ALT 250 FOR IP): Performed by: STUDENT IN AN ORGANIZED HEALTH CARE EDUCATION/TRAINING PROGRAM

## 2024-02-09 PROCEDURE — 99285 EMERGENCY DEPT VISIT HI MDM: CPT

## 2024-02-09 PROCEDURE — 85025 COMPLETE CBC W/AUTO DIFF WBC: CPT

## 2024-02-09 PROCEDURE — 36415 COLL VENOUS BLD VENIPUNCTURE: CPT

## 2024-02-09 PROCEDURE — 80053 COMPREHEN METABOLIC PANEL: CPT

## 2024-02-09 PROCEDURE — 85379 FIBRIN DEGRADATION QUANT: CPT

## 2024-02-09 PROCEDURE — 93005 ELECTROCARDIOGRAM TRACING: CPT | Performed by: STUDENT IN AN ORGANIZED HEALTH CARE EDUCATION/TRAINING PROGRAM

## 2024-02-09 PROCEDURE — 96375 TX/PRO/DX INJ NEW DRUG ADDON: CPT

## 2024-02-09 PROCEDURE — 71045 X-RAY EXAM CHEST 1 VIEW: CPT

## 2024-02-09 PROCEDURE — 96361 HYDRATE IV INFUSION ADD-ON: CPT

## 2024-02-09 RX ORDER — IBUPROFEN 600 MG/1
600 TABLET ORAL
Status: COMPLETED | OUTPATIENT
Start: 2024-02-09 | End: 2024-02-09

## 2024-02-09 RX ORDER — PREDNISONE 20 MG/1
40 TABLET ORAL DAILY
Qty: 8 TABLET | Refills: 0 | Status: SHIPPED | OUTPATIENT
Start: 2024-02-09 | End: 2024-02-13

## 2024-02-09 RX ORDER — ACETAMINOPHEN 500 MG
1000 TABLET ORAL
Status: DISCONTINUED | OUTPATIENT
Start: 2024-02-09 | End: 2024-02-09 | Stop reason: HOSPADM

## 2024-02-09 RX ORDER — SODIUM CHLORIDE, SODIUM LACTATE, POTASSIUM CHLORIDE, AND CALCIUM CHLORIDE .6; .31; .03; .02 G/100ML; G/100ML; G/100ML; G/100ML
1000 INJECTION, SOLUTION INTRAVENOUS
Status: DISCONTINUED | OUTPATIENT
Start: 2024-02-09 | End: 2024-02-09

## 2024-02-09 RX ORDER — DIPHENHYDRAMINE HCL 25 MG
50 CAPSULE ORAL EVERY 6 HOURS PRN
Qty: 28 CAPSULE | Refills: 0 | Status: SHIPPED | OUTPATIENT
Start: 2024-02-09 | End: 2024-02-16

## 2024-02-09 RX ORDER — DIPHENHYDRAMINE HYDROCHLORIDE 50 MG/ML
25 INJECTION INTRAMUSCULAR; INTRAVENOUS ONCE
Status: COMPLETED | OUTPATIENT
Start: 2024-02-09 | End: 2024-02-09

## 2024-02-09 RX ORDER — LORAZEPAM 1 MG/1
1 TABLET ORAL ONCE
Status: COMPLETED | OUTPATIENT
Start: 2024-02-09 | End: 2024-02-09

## 2024-02-09 RX ORDER — FAMOTIDINE 20 MG/1
20 TABLET, FILM COATED ORAL 2 TIMES DAILY
Qty: 14 TABLET | Refills: 0 | Status: SHIPPED | OUTPATIENT
Start: 2024-02-09 | End: 2024-02-16

## 2024-02-09 RX ORDER — SODIUM CHLORIDE, SODIUM LACTATE, POTASSIUM CHLORIDE, AND CALCIUM CHLORIDE .6; .31; .03; .02 G/100ML; G/100ML; G/100ML; G/100ML
1000 INJECTION, SOLUTION INTRAVENOUS
Status: COMPLETED | OUTPATIENT
Start: 2024-02-09 | End: 2024-02-09

## 2024-02-09 RX ORDER — DIPHENHYDRAMINE HYDROCHLORIDE 50 MG/ML
25 INJECTION INTRAMUSCULAR; INTRAVENOUS
Status: COMPLETED | OUTPATIENT
Start: 2024-02-09 | End: 2024-02-09

## 2024-02-09 RX ORDER — EPINEPHRINE 1 MG/ML
0.3 INJECTION, SOLUTION, CONCENTRATE INTRAVENOUS
Status: DISCONTINUED | OUTPATIENT
Start: 2024-02-09 | End: 2024-02-09

## 2024-02-09 RX ADMIN — SODIUM CHLORIDE, POTASSIUM CHLORIDE, SODIUM LACTATE AND CALCIUM CHLORIDE 1000 ML: 600; 310; 30; 20 INJECTION, SOLUTION INTRAVENOUS at 15:30

## 2024-02-09 RX ADMIN — IBUPROFEN 600 MG: 600 TABLET, FILM COATED ORAL at 18:25

## 2024-02-09 RX ADMIN — DIPHENHYDRAMINE HYDROCHLORIDE 25 MG: 50 INJECTION INTRAMUSCULAR; INTRAVENOUS at 17:42

## 2024-02-09 RX ADMIN — DIPHENHYDRAMINE HYDROCHLORIDE 25 MG: 50 INJECTION INTRAMUSCULAR; INTRAVENOUS at 15:26

## 2024-02-09 RX ADMIN — LORAZEPAM 1 MG: 1 TABLET ORAL at 17:42

## 2024-02-09 RX ADMIN — FAMOTIDINE 20 MG: 10 INJECTION, SOLUTION INTRAVENOUS at 15:28

## 2024-02-09 RX ADMIN — SODIUM CHLORIDE, POTASSIUM CHLORIDE, SODIUM LACTATE AND CALCIUM CHLORIDE 1000 ML: 600; 310; 30; 20 INJECTION, SOLUTION INTRAVENOUS at 17:41

## 2024-02-09 RX ADMIN — METHYLPREDNISOLONE SODIUM SUCCINATE 125 MG: 125 INJECTION, POWDER, FOR SOLUTION INTRAMUSCULAR; INTRAVENOUS at 15:27

## 2024-02-09 RX ADMIN — IOPAMIDOL 100 ML: 755 INJECTION, SOLUTION INTRAVENOUS at 18:10

## 2024-02-09 ASSESSMENT — PAIN SCALES - GENERAL
PAINLEVEL_OUTOF10: 4
PAINLEVEL_OUTOF10: 2

## 2024-02-09 ASSESSMENT — PAIN DESCRIPTION - PAIN TYPE: TYPE: ACUTE PAIN

## 2024-02-09 ASSESSMENT — PAIN - FUNCTIONAL ASSESSMENT: PAIN_FUNCTIONAL_ASSESSMENT: 0-10

## 2024-02-09 ASSESSMENT — LIFESTYLE VARIABLES
HOW MANY STANDARD DRINKS CONTAINING ALCOHOL DO YOU HAVE ON A TYPICAL DAY: PATIENT DECLINED
HOW OFTEN DO YOU HAVE A DRINK CONTAINING ALCOHOL: PATIENT DECLINED

## 2024-02-09 ASSESSMENT — PAIN DESCRIPTION - LOCATION: LOCATION: CHEST

## 2024-02-09 ASSESSMENT — HEART SCORE
ECG: 1
ECG: 0

## 2024-02-09 ASSESSMENT — PAIN DESCRIPTION - ORIENTATION: ORIENTATION: LEFT;UPPER

## 2024-02-09 NOTE — ED TRIAGE NOTES
Pt comes in ambulatory crying and clutching her upper chest. Pt is crying and is with her coworker.  Pt's coworker mentioned that they were outside earlier spray painting [employer] boxes and they brought them inside and they think she is having an allergic reaction. Pt reports taking Benydral around lunch time. Pt reports starting to feel sever  SOB around 1500 with chest pain.

## 2024-02-10 NOTE — ED NOTES
Pt given discharge instructions, pt education, 3 prescriptions and follow up information. Pt verbalizes understanding. All questions answered. Pt discharged to home in private vehicle with daughter, ambulatory. Pt A&O x4, RA, pain controlled.

## 2024-02-10 NOTE — ED PROVIDER NOTES
[facility]  EMERGENCY DEPARTMENT ENCOUNTER      Pt Name: Aida Sanchez  MRN: [id number]  Birthdate 1970  Date of evaluation: 2/9/2024  Provider: Adelaida Mason DO    CHIEF COMPLAINT       Chief Complaint   Patient presents with    Chest Pain    Shortness of Breath       PMH   Past Medical History:   Diagnosis Date    Arthritis     Depression     Diabetes (HCC)     Hypertension     Rheumatoid arthritis(714.0) 3/3/2015         MDM:   Vitals:    Vitals:    02/09/24 1910   BP:    Pulse: (!) 104   Resp: 21   Temp:    SpO2: 96%           This is a 53 y.o. female with pmhx HTN, allergies, arthritis, DM, RA  who presents today for cc of shortness of breath. Patient states she was involved in some painting today,  and notably afterwards felt anxious, short of breath, and some chest pain >3hr prior to arrival.      On arrival VS includes mild tachycardia.   Physical Exam  General: Alert, tearful, hyperventilating, morbidly obese  HEENT: Normocephalic, atraumatic. EOMI, moist oral mucosa, no conjunctival injection. No airway edema. No stridor.  Speaking in a somewhat high-pitched voice without \"hot potato\" quality.  Neck: ROM normal, supple  Cardio: Heart regular rate and rhythm, cap refill <2seconds  Lungs: No respiratory distress, no wheezing, CTAB  Abdomen: Soft, nontender  MSK: ROM normal, no LE edema  Skin: Warm, dry, no rash  Neuro: No focal neurodeficits, Aox3    Patient states this is how her allergic reactions present, however she notably has stable O2 on RA and clear breath sounds without signs of anaphylaxis many hours after exposure. Will treat as possible allergic reaction and obtain workup for chest pain/dyspnea.     Workup notable for elevated d-dimer, well's score was low risk, CTA negative for PE. CXR clear. EKG nonishemic. Heart score 3, more than 3hr symptoms,and troponin negative. Patient given fluids for tachycardia.     Patient reevaluated, feeling improved.  No longer tearful,        ALLERGIES     Macadamia nut oil, Other, Propoxyphene, Aspirin, Sulfa antibiotics, and Sumatriptan    FAMILY HISTORY       Family History   Problem Relation Age of Onset    Hypertension Mother     Osteoarthritis Mother           SOCIAL HISTORY       Social History     Socioeconomic History    Marital status:    Tobacco Use    Smoking status: Never    Smokeless tobacco: Never   Substance and Sexual Activity    Alcohol use: Yes     Alcohol/week: 0.8 standard drinks of alcohol    Drug use: No       PHYSICAL EXAM    (up to 7 for level 4, 8 or more for level 5)     ED Triage Vitals   BP Temp Temp Source Pulse Respirations SpO2 Height Weight - Scale   02/09/24 1515 02/09/24 1519 02/09/24 1519 02/09/24 1519 02/09/24 1519 02/09/24 1515 02/09/24 1519 02/09/24 1519   (!) 132/95 98.3 °F (36.8 °C) Oral (!) 124 22 98 % 1.626 m (5' 4\") (!) 151.3 kg (333 lb 8.9 oz)       Body mass index is 57.25 kg/m².    Physical Exam    See mdm    DIAGNOSTIC RESULTS     EKG: All EKG's are interpreted by the Emergency Department Physician who either signs or Co-signs this chart in the absence of a cardiologist.        RADIOLOGY:   Non-plain film images such as CT, Ultrasound and MRI are read by the radiologist.    Interpretation per the Radiologist below, if available at the time of this note:    CTA CHEST W WO CONTRAST   Final Result   Suboptimal pulmonary embolism study due to poor contrast bolus timing. No   evidence of central pulmonary embolism. Small segmental and subsegmental vessels   cannot be well evaluated.   Clear lungs.         XR CHEST PORTABLE   Final Result   No acute cardiopulmonary disease.               LABS:  Labs Reviewed   COMPREHENSIVE METABOLIC PANEL - Abnormal; Notable for the following components:       Result Value    Anion Gap 16 (*)     Glucose 235 (*)     Bun/Cre Ratio 27 (*)     Alk Phosphatase 138 (*)     All other components within normal limits   CBC WITH AUTO DIFFERENTIAL - Abnormal; Notable for the

## 2024-02-11 LAB
EKG DIAGNOSIS: NORMAL
EKG Q-T INTERVAL: 326 MS
EKG QRS DURATION: 72 MS
EKG QTC CALCULATION (BAZETT): 466 MS
EKG R AXIS: 39 DEGREES
EKG T AXIS: 75 DEGREES
EKG VENTRICULAR RATE: 123 BPM

## 2024-02-12 ENCOUNTER — NURSE TRIAGE (OUTPATIENT)
Dept: OTHER | Facility: CLINIC | Age: 54
End: 2024-02-12

## 2024-02-12 ENCOUNTER — CARE COORDINATION (OUTPATIENT)
Dept: OTHER | Facility: CLINIC | Age: 54
End: 2024-02-12

## 2024-02-12 NOTE — CARE COORDINATION
Ambulatory Care Coordination Note    LPN CC attempted to reach patient for introduction to Associate Care Management related to ER visit. HIPAA compliant message left requesting a return phone call at patient convenience.     Plan for follow-up call in 1-2 days      Future Appointments   Date Time Provider Department Center   2/15/2024  8:00 AM [facility] SCHEDULE [id number] AMB   5/6/2024  7:30 AM [facility]

## 2024-02-12 NOTE — TELEPHONE ENCOUNTER
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 \"the fumes filled the hallway and I couldn't breathe, it was like I had an allergic reaction. My stats dropped\"     Pt see in ER and treated on Friday. States symptoms have resolved, no concerns at this time.     Recommended disposition: Home Care    Employee injury packet sent to employee with listing of approved providers and locations. Employee aware they must be seen by one of the panel physicians, not their own PCP. Employee verbalizes understanding.    Care advice provided, caller verbalizes understanding; denies any other questions or concerns.    Reason for Disposition   Caller has already spoken with the PCP (or office), and has no further questions    Protocols used: No Contact or Duplicate Contact Call-ADULT-[place]

## 2024-02-13 ENCOUNTER — CARE COORDINATION (OUTPATIENT)
Dept: OTHER | Facility: CLINIC | Age: 54
End: 2024-02-13

## 2024-02-13 NOTE — CARE COORDINATION
ACM follow up note.   Patient received from discharge list on 2/12/24 due to ER visit on 2/11/24 after exposure to paint fumes while at work with possible allergic reaction.   ACM attempted initial outreach on 2/12/24, with no answer or return call.   Per chart review, patient was contacted by Occupational health on 2/12/24 due to work related incident. Patient reported no further symptoms and was instructed to follow up with Occupational Health physician if needed instead of PCP. Patient was instructed to call Occupational Health for further needs.   ACM will end episode and make no further outreach attempts since being followed by Occupational Health for work related incident covered by Workers Compensation.

## 2024-02-15 ENCOUNTER — PROCEDURE VISIT (OUTPATIENT)
Age: 54
End: 2024-02-15

## 2024-02-15 DIAGNOSIS — R20.2 PARESTHESIA: Primary | ICD-10-CM

## 2024-02-15 NOTE — PROGRESS NOTES
EMG/ NCS Report  [facility] [address], [address]  [place]  [address]   Ph: [phone]   FAX: [phone]/ [phone]  Test Date:  2019      Test Date:  2/15/2024    Patient: KELLY DIMAS : 1970 Physician: Guru White MD   Sex: Female Height: ' \" Ref Phys: JAIME ACUÑA   ID#: [id number] Weight:  lbs. Technician: Anna Monet     Patient History / Exam:  CC:Index and thumb fingers mark anthony. pt. is known diabetic x 15 yrs.    Patient is coming for neuropathy evaluation      EMG & NCV Findings:  Evaluation of the left median motor nerve showed prolonged distal onset latency (5.9 ms), normal amplitude (7.0 mV), and normal conduction velocity (Elbow-Wrist, 63 m/s).  The right median motor nerve showed prolonged distal onset latency (10.6 ms), reduced amplitude (0.6 mV), and normal conduction velocity (Elbow-Wrist, 66 m/s).  The left ulnar motor and the right ulnar motor nerves showed normal distal onset latency (L2.1, R2.9 ms), normal amplitude (L10.8, R9.9 mV), normal conduction velocity (B Elbow-Wrist, L55, R68 m/s), and normal conduction velocity (A Elbow-B Elbow, L56, R67 m/s).  The left median sensory and the right median sensory nerves showed no response (Wrist).  The left radial sensory, the right radial sensory, the left ulnar sensory, and the right ulnar sensory nerves showed normal distal peak latency (L1.8, R1.9, L3.1, R2.9 ms) and normal amplitude (L27.9, R29.7, L18.9, R24.0 µV).  Left vs. Right side comparison data for the ulnar motor nerve indicates abnormal L-R velocity difference (B Elbow-Wrist, 13 m/s).  All remaining left vs. right side differences were within normal limits.      All F Wave latencies were within normal limits.  All F Wave left vs. right side latency differences were within normal limits.      Needle evaluation of the left abductor pollicis brevis muscle showed diminished recruitment.  The

## 2024-03-04 ENCOUNTER — TELEPHONE (OUTPATIENT)
Facility: HOSPITAL | Age: 54
End: 2024-03-04

## 2024-03-04 NOTE — TELEPHONE ENCOUNTER
Specialty Medication Service    Date: 3/4/2024  Patient's Name: Aida Sanchez YOB: 1970            _____________________________________________________________________________________________    Called patient's specialist office to request most recent office visit summary and relevant labs for Specialty Medication Service formulary medication.  Faxed office signed TIO  to have faxed to [phone]. Added Notes to      Coleen Pettit CPhT  Clinical    Specialty Medication Services  Phone: [phone] option #4  Fax: [phone]      For Pharmacy Admin Tracking Only    Program: SMS  CPA in place:  Yes  Recommendation Provided To: Provider: 1 via Fax sent to office    Intervention Accepted By: Provider: 1    Time Spent (min): 20

## 2024-03-04 NOTE — TELEPHONE ENCOUNTER
Specialty Medication Service    Date: 3/4/2024  Patient's Name: Aida Sanchez YOB: 1970            _____________________________________________________________________________________________    Called patient's specialist office to request most recent office visit summary and relevant labs for Specialty Medication Service formulary medication.  Faxed office signed TIO  to have faxed to [phone].     Giuseppe SchultzD [facility]  Ambulatory Clinical Pharmacist  Specialty Medication Services  Phone: [phone] option #4  Fax: [phone]    For Pharmacy Admin Tracking Only    Program: SMS  CPA in place:  Yes  Recommendation Provided To: Provider: 1 via Fax sent to office    Intervention Accepted By: Provider: 0    Time Spent (min): 20

## 2024-03-18 NOTE — PROGRESS NOTES
[address]  [address] [facility], [employer] STUDY  Patient: Matt Osorio (11 y.o. female)  Date: 4/25/2023  Referring Provider:  Hina García    SUBJECTIVE:   Patient reports 3 month history of globus sensation at the level of the sternal notch, most frequently with pills and meats. Ground beef is especially challenging. Occasionally, food comes back up well after eating. Occasional choking on saliva. OBJECTIVE:   Past Medical History:    Past Medical History:   Diagnosis Date    Arthritis     Depression     Diabetes ([address]     Hypertension     Rheumatoid arthritis(714.0) 3/3/2015     Past Surgical History:   Procedure Laterality Date    COLONOSCOPY N/A 6/11/2021    COLONOSCOPY performed by Tory Patricia MD at [address] [facility]      HX HEMORRHOIDECTOMY      HX OTHER SURGICAL      Hemorrhoidectomy      Current Dietary Status:  regular with thins  Radiologist: Dao Peraza Views: Lateral;Fluoro  Patient Position: standing    Trial 1:   Consistency Presented: Thin liquid; Solid;Pudding;Pill/Tablet   How Presented: Self-fed/presented;SLP-fed/presented;Spoon;Straw;Cup/gulp;Cup/sip; Successive swallows       Bolus Acceptance: No impairment   Bolus Formation/Control: No impairment:     Propulsion: No impairment   Oral Residue: None   Initiation of Swallow: No impairment   Timing: No impairment   Penetration: None   Aspiration/Timing: No evidence of aspiration   Pharyngeal Clearance: No residue                       Decreased Tongue Base Retraction?: No  Laryngeal Elevation: WFL (within functional limits)  Aspiration/Penetration Score: 1 (No penetration or aspiration-Contrast does not enter the airway)  Pharyngeal Symmetry: Not assessed  Pharyngeal-Esophageal Segment: No impairment  Pharyngeal Dysfunction: None    Oral Phase Severity: No impairment  Pharyngeal Phase Severity: N/A    ASSESSMENT :  Based on the objective data described above, the patient presents with functional oropharyngeal swallow for all consistencies trialed. No penetration, aspiration, or pharyngeal residue was  noted with any consistency. Of note, patient with persistent globus sensation after the study at the level of the sternal notch even in absence of residue noted on MBS. This often correlates with an esophageal dysphagia or irritation. PLAN/RECOMMENDATIONS :  Diet as tolerated  Consider workup for esophageal dysphagia     COMMUNICATION/EDUCATION:   The above findings and recommendations were discussed with:  patient  who verbalized understanding.     Thank you for this referral.  Natalie Cormier, SLP  Time Calculation: 15 mins [Normal] : no acute distress, well nourished, well developed and well-appearing [Clear to Auscultation] : lungs were clear to auscultation bilaterally [de-identified] : No sternal or costocondral tenderness. Lateral compression of rib cage is painless.

## 2024-04-01 ENCOUNTER — TELEPHONE (OUTPATIENT)
Facility: HOSPITAL | Age: 54
End: 2024-04-01

## 2024-04-01 NOTE — TELEPHONE ENCOUNTER
Specialty Medication Service    Date: 4/1/2024  Patient's Name: Aida Sanchez YOB: 1970            _____________________________________________________________________________________________    Patient's PA for Enbrel has been approved through 04/01/24 - 03/31/25. Approval letter uploaded in Media tab    Coleen Pettit CPhT  Clinical   Specialty Medication Services  Phone: [phone] option #4  Fax: [phone]    For Pharmacy Admin Tracking Only    Program: SMS  CPA in place:  Yes  Recommendation Provided To: Other: 1  Intervention Detail: Benefit Assistance  Intervention Accepted By: Other: 1  Gap Closed?:    Time Spent (min): 30

## 2024-04-01 NOTE — TELEPHONE ENCOUNTER
Specialty Medication Service    Date: 4/1/2024  Patient's Name: Aida Sanchez YOB: 1970            _____________________________________________________________________________________________    PA for patient's Enbrel submitted to payer for approval. [facility] key: [id number]. Will continue to monitor for PA determination.     James Humphrey, PharmD [facility]  Ambulatory Clinical Pharmacist  Specialty Medication Services  Phone: [phone] option #4  Fax: [phone]     For Pharmacy Admin Tracking Only    Program: SMS  CPA in place:  Yes  Recommendation Provided To: Other: 1  Intervention Detail: Benefit Assistance  Intervention Accepted By: Other: 0    Time Spent (min): 30

## 2024-05-03 RX ORDER — MIRABEGRON 50 MG/1
50 TABLET, FILM COATED, EXTENDED RELEASE ORAL DAILY
COMMUNITY
Start: 2024-03-27

## 2024-05-03 RX ORDER — EPINEPHRINE 0.3 MG/.3ML
INJECTION SUBCUTANEOUS
COMMUNITY
Start: 2024-03-27

## 2024-05-03 RX ORDER — MULTIVITAMIN,THERAPEUTIC
1 TABLET ORAL DAILY
COMMUNITY
End: 2024-05-06

## 2024-05-06 ENCOUNTER — PHARMACY VISIT (OUTPATIENT)
Facility: HOSPITAL | Age: 54
End: 2024-05-06

## 2024-05-06 DIAGNOSIS — M05.79 SEROPOSITIVE RHEUMATOID ARTHRITIS OF MULTIPLE SITES (HCC): Primary | ICD-10-CM

## 2024-05-06 ASSESSMENT — PROMIS GLOBAL HEALTH SCALE
IN GENERAL, HOW WOULD YOU RATE YOUR SATISFACTION WITH YOUR SOCIAL ACTIVITIES AND RELATIONSHIPS [ON A SCALE OF 1 (POOR) TO 5 (EXCELLENT)]?: FAIR
SUM OF RESPONSES TO QUESTIONS 3, 6, 7, & 8: 13
IN THE PAST 7 DAYS, HOW OFTEN HAVE YOU BEEN BOTHERED BY EMOTIONAL PROBLEMS, SUCH AS FEELING ANXIOUS, DEPRESSED, OR IRRITABLE [ON A SCALE FROM 1 (NEVER) TO 5 (ALWAYS)]?: SOMETIMES
IN GENERAL, WOULD YOU SAY YOUR HEALTH IS...[ON A SCALE OF 1 (POOR) TO 5 (EXCELLENT)]: GOOD
IN GENERAL, WOULD YOU SAY YOUR QUALITY OF LIFE IS...[ON A SCALE OF 1 (POOR) TO 5 (EXCELLENT)]: GOOD
TO WHAT EXTENT ARE YOU ABLE TO CARRY OUT YOUR EVERYDAY PHYSICAL ACTIVITIES SUCH AS WALKING, CLIMBING STAIRS, CARRYING GROCERIES, OR MOVING A CHAIR [ON A SCALE OF 1 (NOT AT ALL) TO 5 (COMPLETELY)]?: COMPLETELY
IN GENERAL, HOW WOULD YOU RATE YOUR PHYSICAL HEALTH [ON A SCALE OF 1 (POOR) TO 5 (EXCELLENT)]?: GOOD
SUM OF RESPONSES TO QUESTIONS 2, 4, 5, & 10: 11
IN THE PAST 7 DAYS, HOW WOULD YOU RATE YOUR PAIN ON AVERAGE [ON A SCALE FROM 0 (NO PAIN) TO 10 (WORST IMAGINABLE PAIN)]?: 1
IN GENERAL, PLEASE RATE HOW WELL YOU CARRY OUT YOUR USUAL SOCIAL ACTIVITIES (INCLUDES ACTIVITIES AT HOME, AT WORK, AND IN YOUR COMMUNITY, AND RESPONSIBILITIES AS A PARENT, CHILD, SPOUSE, EMPLOYEE, FRIEND, ETC) [ON A SCALE OF 1 (POOR) TO 5 (EXCELLENT)]?: FAIR
IN GENERAL, HOW WOULD YOU RATE YOUR MENTAL HEALTH, INCLUDING YOUR MOOD AND YOUR ABILITY TO THINK [ON A SCALE OF 1 (POOR) TO 5 (EXCELLENT)]?: GOOD
IN THE PAST 7 DAYS, HOW WOULD YOU RATE YOUR FATIGUE ON AVERAGE [ON A SCALE FROM 1 (NONE) TO 5 (VERY SEVERE)]?: MILD

## 2024-05-06 ASSESSMENT — PATIENT HEALTH QUESTIONNAIRE - PHQ9
SUM OF ALL RESPONSES TO PHQ QUESTIONS 1-9: 2
SUM OF ALL RESPONSES TO PHQ QUESTIONS 1-9: 2
1. LITTLE INTEREST OR PLEASURE IN DOING THINGS: SEVERAL DAYS
SUM OF ALL RESPONSES TO PHQ9 QUESTIONS 1 & 2: 2
2. FEELING DOWN, DEPRESSED OR HOPELESS: SEVERAL DAYS
SUM OF ALL RESPONSES TO PHQ QUESTIONS 1-9: 2
SUM OF ALL RESPONSES TO PHQ QUESTIONS 1-9: 2

## 2024-05-09 ENCOUNTER — TELEPHONE (OUTPATIENT)
Facility: HOSPITAL | Age: 54
End: 2024-05-09

## 2024-05-09 DIAGNOSIS — M05.79 SEROPOSITIVE RHEUMATOID ARTHRITIS OF MULTIPLE SITES (HCC): Primary | ICD-10-CM

## 2024-05-09 NOTE — TELEPHONE ENCOUNTER
Specialty Medication Service    Date: 5/9/2024  Patient's Name: Aida Sanchez YOB: 1970            _____________________________________________________________________________________________    Aida Sanchez is a 53 y.o. female enrolled in the Specialty Medication Service.     We received a refill request for Enbrel on 05/09/24.     Original Rx was written for 11 fills on 05/06/24.     Thank you,    Coleen Pettit      Requested Prescriptions     Pending Prescriptions Disp Refills    Etanercept 50 MG/ML SOAJ 12 mL 11     Sig: Inject 50 mg (1 auto-injector) under the skin weekly

## 2024-05-09 NOTE — TELEPHONE ENCOUNTER
Specialty Medication Service    Date: 5/9/2024  Patient's Name: Aida Sanchez YOB: 1970            _____________________________________________________________________________________________     Aida Sanchez is a 53 y.o.  female enrolled in the Specialty Medication Service program. Refill request received for Enbrel.    Patient does have active CPA on file.   Patient last SMS pharmacist visit was within the last 6 months.  Patient last medical director visit was within the last year.  Patient has seen their specialist within the last year.   Labs were reviewed. Acceptable, watch Alk phos  [facility] medical director referral is on file: yes  Next SMS visit is scheduled 11/04/2024.    Chart reviewed. No significant concerns noted, patient is compliant with the program.     Will approve the refill for 12 refills, per the original provider order.      Orders Placed This Encounter    Etanercept 50 MG/ML SOAJ     Sig: Inject 50 mg (1 auto-injector) under the skin weekly     Dispense:  12 mL     Refill:  3          James Humphrey PharmD [facility]  Ambulatory Clinical Pharmacist  Specialty Medication Services  Phone: [phone] option #4  Fax: [phone]     For Pharmacy Admin Tracking Only    Program: [facility]  CPA in place:  Yes  Recommendation Provided To: Patient/Caregiver: 1 via Telephone  Intervention Detail: Refill(s) Provided  Intervention Accepted By: Patient/Caregiver: 1    Time Spent (min): 10

## 2024-06-01 ENCOUNTER — APPOINTMENT (OUTPATIENT)
Facility: HOSPITAL | Age: 54
End: 2024-06-01
Payer: COMMERCIAL

## 2024-06-01 ENCOUNTER — HOSPITAL ENCOUNTER (EMERGENCY)
Facility: HOSPITAL | Age: 54
Discharge: HOME | End: 2024-06-01
Attending: EMERGENCY MEDICINE
Payer: COMMERCIAL

## 2024-06-01 VITALS
SYSTOLIC BLOOD PRESSURE: 146 MMHG | RESPIRATION RATE: 18 BRPM | DIASTOLIC BLOOD PRESSURE: 81 MMHG | TEMPERATURE: 98 F | HEART RATE: 101 BPM | HEIGHT: 64 IN | BODY MASS INDEX: 50.02 KG/M2 | WEIGHT: 293 LBS | OXYGEN SATURATION: 96 %

## 2024-06-01 DIAGNOSIS — M17.11 OSTEOARTHRITIS OF RIGHT KNEE, UNSPECIFIED OSTEOARTHRITIS TYPE: Primary | ICD-10-CM

## 2024-06-01 LAB — ECHO BSA: 2.53 M2

## 2024-06-01 PROCEDURE — 99284 EMERGENCY DEPT VISIT MOD MDM: CPT

## 2024-06-01 PROCEDURE — 96374 THER/PROPH/DIAG INJ IV PUSH: CPT

## 2024-06-01 PROCEDURE — 93971 EXTREMITY STUDY: CPT

## 2024-06-01 PROCEDURE — 6370000000 HC RX 637 (ALT 250 FOR IP)

## 2024-06-01 PROCEDURE — 73562 X-RAY EXAM OF KNEE 3: CPT

## 2024-06-01 PROCEDURE — 6360000002 HC RX W HCPCS

## 2024-06-01 RX ORDER — HYDROCODONE BITARTRATE AND ACETAMINOPHEN 5; 325 MG/1; MG/1
1 TABLET ORAL
Status: COMPLETED | OUTPATIENT
Start: 2024-06-01 | End: 2024-06-01

## 2024-06-01 RX ORDER — MORPHINE SULFATE 4 MG/ML
4 INJECTION, SOLUTION INTRAMUSCULAR; INTRAVENOUS
Status: COMPLETED | OUTPATIENT
Start: 2024-06-01 | End: 2024-06-01

## 2024-06-01 RX ADMIN — MORPHINE SULFATE 4 MG: 4 INJECTION INTRAVENOUS at 16:14

## 2024-06-01 RX ADMIN — HYDROCODONE BITARTRATE AND ACETAMINOPHEN 1 TABLET: 5; 325 TABLET ORAL at 15:08

## 2024-06-01 ASSESSMENT — PAIN SCALES - GENERAL
PAINLEVEL_OUTOF10: 8
PAINLEVEL_OUTOF10: 4
PAINLEVEL_OUTOF10: 8

## 2024-06-01 ASSESSMENT — PAIN DESCRIPTION - ORIENTATION
ORIENTATION: LEFT

## 2024-06-01 ASSESSMENT — PAIN DESCRIPTION - LOCATION
LOCATION: LEG
LOCATION: LEG
LOCATION: LEG;KNEE

## 2024-06-01 ASSESSMENT — PAIN - FUNCTIONAL ASSESSMENT: PAIN_FUNCTIONAL_ASSESSMENT: 0-10

## 2024-06-01 ASSESSMENT — LIFESTYLE VARIABLES
HOW OFTEN DO YOU HAVE A DRINK CONTAINING ALCOHOL: NEVER
HOW MANY STANDARD DRINKS CONTAINING ALCOHOL DO YOU HAVE ON A TYPICAL DAY: PATIENT DOES NOT DRINK

## 2024-06-01 ASSESSMENT — PAIN DESCRIPTION - DESCRIPTORS: DESCRIPTORS: ACHING

## 2024-06-01 NOTE — ED NOTES
Patient verbalizes understanding that she must have a ride home/we can offer ride home in order to receive narcotic pain medications. Pt sts she will have a ride home and understands she is unable to drive as well as consequences for driving.

## 2024-06-01 NOTE — ED TRIAGE NOTES
Pt presents to ED with c/o left posterior knee pain that occurred around 1030am today and now pain is to her left foot with toe numbness. No known injuries. Pt denies calf swelling.

## 2024-06-01 NOTE — DISCHARGE INSTRUCTIONS
We did not find any concerning abnormalities in your workup today except for the severe osteoarthritis of your left knee that you are aware.  It is quite likely that this is a flare and the medications you are currently taking may help with the pain.  We would like for you to follow-up with orthopedics as well as your primary care given your visit today here.  If symptoms worsen or new concerning symptoms arise, please report to the nearest emergency department.    Thank you for allowing us to provide you with medical care today.  We realize that you have many choices for your emergency care needs.  We thank you for choosing [facility].  Please choose us in the future for any continued health care needs.     The exam and treatment you received in the Emergency Department were for an emergent problem and are not intended as complete care. It is important that you follow up with a doctor, nurse practitioner, or physician's assistant for ongoing care. If your symptoms worsen or you do not improve as expected and you are unable to reach your usual health care provider, you should return to the Emergency Department.  We are available 24 hours a day.     Please make an appointment with your health care provider(s) for follow up of your Emergency Department visit.  Take this sheet with you when you go to your follow-up visit.

## 2024-06-01 NOTE — ED PROVIDER NOTES
[facility] [place] EMERGENCY DEPT  EMERGENCY DEPARTMENT ENCOUNTER      Pt Name: Aida Sanchez  MRN: [id number]  Birthdate 1970  Date of evaluation: 2024  Provider: Phu Cyr PA-C    CHIEF COMPLAINT       Chief Complaint   Patient presents with    Leg Pain         HISTORY OF PRESENT ILLNESS   (Location/Symptom, Timing/Onset, Context/Setting, Quality, Duration, Modifying Factors, Severity)  Note limiting factors.   53-year-old female with history of osteoarthritis and rheumatoid arthritis reports to ED with abrupt onset of left knee pain with radiation to her left foot/toes that occurred a few hours prior to arrival.  Patient works as a radiology technician and was told by radiologist to go to ED for DVT workup.  Denies calf or thigh swelling and states pain is centered behind her left knee.  Denies chest pain, shortness of breath, abdominal pain, fever, or history of prior occurrence.              Review of External Medical Records:     Nursing Notes were reviewed.    REVIEW OF SYSTEMS    (2-9 systems for level 4, 10 or more for level 5)     Review of Systems    Except as noted above the remainder of the review of systems was reviewed and negative.       PAST MEDICAL HISTORY     Past Medical History:   Diagnosis Date    Arthritis     Depression     Diabetes (HCC)     Hypertension     Rheumatoid arthritis(714.0) 3/3/2015         SURGICAL HISTORY       Past Surgical History:   Procedure Laterality Date     SECTION      COLONOSCOPY N/A 2021    COLONOSCOPY performed by Lee Carney MD at [facility] ENDOSCOPY    ESOPHAGEAL DILATATION  2023    DILATION, ESOPHAGUS performed by Lee Carney MD at [facility] ENDOSCOPY    HEMORRHOID SURGERY      OTHER SURGICAL HISTORY      Hemorrhoidectomy     UPPER GASTROINTESTINAL ENDOSCOPY N/A 2023    EGD ESOPHAGOGASTRODUODENOSCOPY performed by Lee Carney MD at [facility] ENDOSCOPY    UPPER GASTROINTESTINAL ENDOSCOPY N/A 2023    EGD BIOPSY

## 2024-06-03 ENCOUNTER — CARE COORDINATION (OUTPATIENT)
Dept: OTHER | Facility: CLINIC | Age: 54
End: 2024-06-03

## 2024-06-03 NOTE — CARE COORDINATION
Ambulatory Care Coordination Note    ACM attempted to reach patient for introduction to Associate Care Management related to ED visit. HIPAA compliant message left requesting a return phone call at patient convenience.     Plan for follow-up call in 1-2 days      Future Appointments   Date Time Provider Department Center   11/4/2024  7:30 AM James Humphrey [facility]

## 2024-06-04 ENCOUNTER — CARE COORDINATION (OUTPATIENT)
Dept: OTHER | Facility: CLINIC | Age: 54
End: 2024-06-04

## 2024-06-04 NOTE — CARE COORDINATION
Ambulatory Care Coordination Note    ACM attempted 2nd outreach to patient for introduction to Associate Care Management related to ED visit. HIPAA compliant message left requesting a return phone call at patient convenience.     Unable to Reach Letter sent to patient via [employer].    Will continue to outreach.      Future Appointments   Date Time Provider Department Center   11/4/2024  7:30 AM [facility]

## 2024-06-18 ENCOUNTER — CARE COORDINATION (OUTPATIENT)
Dept: OTHER | Facility: CLINIC | Age: 54
End: 2024-06-18

## 2024-06-18 NOTE — CARE COORDINATION
Ambulatory Care Coordination Note     6/18/2024 10:01 AM     patient outreach attempt by this AC today to offer care management services. ACM was unable to reach the patient by telephone today; left voice message requesting a return phone call to this ACM.  [employer] message sent requesting patient to contact this AC.     Patient episode closed (unable to reach patient) from the High Risk Care Management program on 6/18/2024.  Patient  UTR . No further Ambulatory Care Manager follow up scheduled.

## 2024-06-24 NOTE — PROGRESS NOTES
Chief Complaint   Patient presents with    Complete Physical    Labs     fasting     Patient seen in the office today for a complete physical and fasting labs    Subjective: (As above and below)     Chief Complaint   Patient presents with    Complete Physical    Labs     fasting     she is a 40y.o. year old female who presents for evaluation. Reviewed PmHx, RxHx, FmHx, SocHx, AllgHx and updated in chart. Review of Systems - negative except as listed above    Objective:     Vitals:    08/15/17 0832   BP: 119/86   Pulse: 95   Resp: 20   Temp: 98 °F (36.7 °C)   TempSrc: Oral   SpO2: 97%   Weight: 304 lb 4.8 oz (138 kg)   Height: 5' 4\" (1.626 m)     Physical Examination: General appearance - alert, well appearing, and in no distress  Mental status - normal mood, behavior, speech, dress, motor activity, and thought processes  Eyes - pupils equal and reactive, extraocular eye movements intact  Mouth - mucous membranes moist, pharynx normal without lesions  Chest - clear to auscultation, no wheezes, rales or rhonchi, symmetric air entry  Heart - normal rate, regular rhythm, normal S1, S2, no murmurs, rubs, clicks or gallops  Musculoskeletal - no joint tenderness, deformity or swelling    Assessment/ Plan:   1. Routine general medical examination at a health care facility  -routine labs  -PAP up to date    2. Type 2 diabetes mellitus without complication, without long-term current use of insulin ([facility])  -check fasting labs  - CBC WITH AUTOMATED DIFF  - LIPID PANEL  - METABOLIC PANEL, COMPREHENSIVE  - TSH 3RD GENERATION  - VITAMIN D, 25 HYDROXY  - HEMOGLOBIN A1C WITH EAG  - MICROALBUMIN, UR, RAND W/ MICROALBUMIN/CREA RATIO  -  DIABETES FOOT EXAM    3. Essential hypertension  Well controlled    4. Seropositive rheumatoid arthritis of multiple sites (HCC)  - C REACTIVE PROTEIN, QT  - SED RATE (ESR)    5. OAB (overactive bladder)  - oxybutynin chloride XL (DITROPAN XL) 10 mg CR tablet; Take 1 Tab by mouth daily. Patient is here today for her 2 week follow up.  She had lysis of labial lesion on 6/5/2024.      Currently she reports feeling great, almost all symptoms have resolved.    Primary care provider: Maribel Owens,      Latex:  Patient denies allergy to latex.  Medications reviewed with patient.  Tobacco use verified.  Allergies verified.    Patient would like communication of their results via:   Tay[employer]    Patient's current [facility] status: Active.     Chaperone present during sensitive exam: I will be chaperoning per provider preference.   Dispense: 90 Tab; Refill: 3     Follow-up Disposition: As needed  I have discussed the diagnosis with the patient and the intended plan as seen in the above orders. The patient has received an after-visit summary and questions were answered concerning future plans.      Medication Side Effects and Warnings were discussed with patient: yes  Patient Labs were reviewed: yes  Patient Past Records were reviewed:  yes    Howard Topete M.D.

## 2024-10-30 ENCOUNTER — OFFICE VISIT (OUTPATIENT)
Dept: PRIMARY CARE CLINIC | Facility: CLINIC | Age: 54
End: 2024-10-30
Payer: COMMERCIAL

## 2024-10-30 VITALS
DIASTOLIC BLOOD PRESSURE: 84 MMHG | HEIGHT: 64 IN | SYSTOLIC BLOOD PRESSURE: 136 MMHG | WEIGHT: 293 LBS | TEMPERATURE: 98.1 F | BODY MASS INDEX: 50.02 KG/M2 | HEART RATE: 96 BPM

## 2024-10-30 DIAGNOSIS — L03.011 PARONYCHIA OF FINGER OF RIGHT HAND: Primary | ICD-10-CM

## 2024-10-30 PROCEDURE — 3075F SYST BP GE 130 - 139MM HG: CPT | Performed by: FAMILY MEDICINE

## 2024-10-30 PROCEDURE — 3079F DIAST BP 80-89 MM HG: CPT | Performed by: FAMILY MEDICINE

## 2024-10-30 PROCEDURE — 99213 OFFICE O/P EST LOW 20 MIN: CPT | Performed by: FAMILY MEDICINE

## 2024-10-30 RX ORDER — CHLORHEXIDINE GLUCONATE 2% 2 G/100ML
SOLUTION TOPICAL
Qty: 473 ML | Refills: 1 | Status: SHIPPED | OUTPATIENT
Start: 2024-10-30

## 2024-10-30 RX ORDER — MUPIROCIN 20 MG/G
OINTMENT TOPICAL
Qty: 30 G | Refills: 0 | Status: SHIPPED | OUTPATIENT
Start: 2024-10-30 | End: 2024-11-06

## 2024-10-30 ASSESSMENT — ENCOUNTER SYMPTOMS
ABDOMINAL PAIN: 0
CONSTIPATION: 0
DIARRHEA: 0
SHORTNESS OF BREATH: 0

## 2024-10-30 NOTE — PROGRESS NOTES
\"Have you been to the ER, urgent care clinic since your last visit?  Hospitalized since your last visit?\"    NO    “Have you seen or consulted any other health care providers outside our system since your last visit?”    NO    Have you had a mammogram?”   YES - Where:  Nurse/CMA to request most recent records if not in the chart    No breast cancer screening on file      “Have you had a pap smear?”    YES - Where:  Nurse/CMA to request most recent records if not in the chart    Date of last Cervical Cancer screen (HPV or PAP): 12/10/2015       “Have you had a diabetic eye exam?”    YES - Where:  Nurse/CMA to request most recent records if not in the chart     Date of last diabetic eye exam: 3/2/2017

## 2024-10-30 NOTE — PROGRESS NOTES
PCP: Jolie Iglesias MD    CC: Other (3rd finger on right hand infected. Started over weekend like saturday)      Subjective      Aida Sanchez is a 53 y.o. female,Established patient, who presents for finger pain.     Patient was biting her nails on Saturday, then noticed increased pain and swelling of the right middle finger.  She has never had this before.  She has not noticed any further lesion or abscess formation since.  She does have some redness of the distal portion of the right long finger.  She noticed some tenderness at the nailbeds and nail folds.  She has been using peroxide soaks for treatment thus far. Denies fever of chills.       Review of Systems   Constitutional:  Negative for chills, fever and unexpected weight change.   HENT: Negative.     Eyes:  Negative for visual disturbance.   Respiratory:  Negative for shortness of breath.    Gastrointestinal:  Negative for abdominal pain, constipation and diarrhea.   Genitourinary: Negative.    Musculoskeletal: Negative.    Skin:  Negative for rash.   Neurological:  Negative for dizziness and headaches.   Psychiatric/Behavioral:  Negative for dysphoric mood and suicidal ideas.        Objective      Vitals:    10/30/24 0906   BP: 136/84   Site: Right Upper Arm   Position: Sitting   Cuff Size: Large Adult   Pulse: 96   Temp: 98.1 °F (36.7 °C)   TempSrc: Oral   Weight: (!) 140.1 kg (308 lb 12.8 oz)   Height: 1.626 m (5' 4\")      Body mass index is 53.01 kg/m².       Physical Exam  HENT:      Head: Normocephalic.      Right Ear: External ear normal.      Left Ear: External ear normal.      Nose: Nose normal.   Eyes:      Extraocular Movements: Extraocular movements intact.      Conjunctiva/sclera: Conjunctivae normal.      Pupils: Pupils are equal, round, and reactive to light.   Cardiovascular:      Rate and Rhythm: Normal rate and regular rhythm.      Pulses: Normal pulses.   Pulmonary:      Effort: Pulmonary effort is normal.      Breath

## 2024-11-01 ENCOUNTER — TELEPHONE (OUTPATIENT)
Facility: HOSPITAL | Age: 54
End: 2024-11-01

## 2024-11-01 NOTE — PROGRESS NOTES
Rheumatoid Arthritis  Medication Reconciliation completed (information obtained from patient), no new drug-drug interactions identified. Allergy and diagnosis info reviewed and updated. Aida Sanchez has a history remarkable for the following conditions: rheumatoid arthritis, osteoarthritis, diabetes, nausea, depression, hypertension, rhinitis, vitamin D deficiency, carpal tunnel syndrome.   Current therapy includes: Enbrel 50 mg weekly + MTX weekly + hydroxychloroquine 200 mg twice daily for now. Naproxen twice daily   Medication Effectiveness: Patient disease is  well controlled on current therapy. Denies flares or exacerbations, states no current symptoms of RA and happy with medication regimen.   Patient had no questions regarding the medication's warnings, precautions, and contraindications. Confirmed appropriate storage and disposal.  Patient had no concerns with the administration process.  Current disease state symptoms include: None, denies joint pain, swelling, stiffness. States fatigue is mild and work related (working 7 days per week).   No side effects/adverse events reported, and no adherence issues identified.  Functional and cognitive limitations include: None per patient  Patient is not considered high risk. Based on patient feedback/results of the assessment, the therapy is  still appropriate.   No medication-related problem(s) or patient need(s) identified that would require a care plan. Follow up in 180 days     Immunizations  Immunization status: up to date and documented, missing doses of Covid 2024/25. Discussed today and patient agreeable to obtain, no questions.    Drug Interactions  No new clinically significant interactions identified via Lexicomp Interaction Analysis as category D or higher.   No changes in previously discussed drug interactions.     Other Identified Potential Issues  Patient has completed SMS consent form. No questions in regard to consent form. Read consent form to

## 2024-11-04 ENCOUNTER — PHARMACY VISIT (OUTPATIENT)
Facility: HOSPITAL | Age: 54
End: 2024-11-04

## 2024-11-04 ENCOUNTER — TELEPHONE (OUTPATIENT)
Facility: HOSPITAL | Age: 54
End: 2024-11-04

## 2024-11-04 DIAGNOSIS — M05.79 SEROPOSITIVE RHEUMATOID ARTHRITIS OF MULTIPLE SITES (HCC): Primary | ICD-10-CM

## 2024-11-04 ASSESSMENT — PATIENT HEALTH QUESTIONNAIRE - PHQ9
SUM OF ALL RESPONSES TO PHQ QUESTIONS 1-9: 0
SUM OF ALL RESPONSES TO PHQ QUESTIONS 1-9: 0
2. FEELING DOWN, DEPRESSED OR HOPELESS: NOT AT ALL
SUM OF ALL RESPONSES TO PHQ QUESTIONS 1-9: 0
3. TROUBLE FALLING OR STAYING ASLEEP: NOT AT ALL
SUM OF ALL RESPONSES TO PHQ QUESTIONS 1-9: 0

## 2024-11-04 NOTE — TELEPHONE ENCOUNTER
Specialty Medication Service    Date: 11/4/2024  Patient's Name: Aida Sanchez YOB: 1970            _____________________________________________________________________________________________    Inbound fax received from external provider containing patient medical records requested by [employer]. Patient identifiers confirmed on each page to ensure accuracy and protection of privacy. Imported into the chart media, PharmD aware notes are available for viewing.    Arianne Levy CPhT  Pharmacy   Specialty Medication Services   Phone: [phone] option 4      For Pharmacy Admin Tracking Only    Program: [facility]  CPA in place:  Yes  Recommendation Provided To: Provider: 1 via Fax sent to office  Intervention Detail:   Intervention Accepted By: Provider: 1  Gap Closed?:    Time Spent (min): 15

## 2024-11-04 NOTE — TELEPHONE ENCOUNTER
Specialty Medication Service    Date: 11/4/2024  Patient's Name: Aida Sanchez YOB: 1970            _____________________________________________________________________________________________    Faxed patient PCP to send most recent labs (CBC, CMP) to [facility].     James Humphrey, PharmD [facility]  Ambulatory Clinical Pharmacist  Specialty Medication Services  Phone: [phone] option #4  Fax: [phone]    For Pharmacy Admin Tracking Only    Program: [facility]  CPA in place:  Yes  Recommendation Provided To: Provider: 1 via Fax sent to office    Intervention Accepted By: Provider: 0    Time Spent (min): 10     [FreeTextEntry1] : 12 month old male here for f/u from ED. Diagnosed with coxsackie virus, could be any other viral syndrome that is now resolving; now presenting with thrush and diaper dermatitis. Apply nystatin to affected area BID. Recommend zinc oxide with every diaper change. White plaques likely oral thrush. Recommend nystatin up to 4 times per day. Sterilize bottles and nipples. \par \par All questions answered. Parent verbalized agreement with the above plan. Return to office if symptoms persist/worsen.

## 2024-11-05 ENCOUNTER — PHARMACY VISIT (OUTPATIENT)
Facility: HOSPITAL | Age: 54
End: 2024-11-05

## 2024-11-05 DIAGNOSIS — M05.79 SEROPOSITIVE RHEUMATOID ARTHRITIS OF MULTIPLE SITES (HCC): Primary | ICD-10-CM

## 2024-11-05 NOTE — PROGRESS NOTES
Specialty Medication Follow up Virtual Visit  [facility] MEDICATION MANAGEMENT  [address] [facility] [address]  Dept: [phone]  Dept Fax: [phone]  Loc: [phone]  Date of patient's visit: 11/5/2024  Patient's Name:  Aida Sanchez YOB: 1970            Patient Care Team:  Jolie Iglesias MD as PCP - General (Family Medicine)  Jamin Stanley MD as PCP - Empaneled Provider  ================================================================    REASON FOR VISIT/CHIEF COMPLAINT:  No chief complaint on file.    HISTORY OF PRESENTING ILLNESS:  Aida Sanchez is 53 y.o. is here for a follow up virtual visit for specialty medication.    Specialty Medication: enbrel  Frequency: weekly  Indication: RA  Initially Diagnosed:   Specialist: Vicky   Last Specialist Visit: 2/2024  Side effects includes: NA   Last visit with me was: NA   Aida Sanchez has no new complain today.     DIAGNOSTIC FINDINGS:  CBC:  Lab Results   Component Value Date/Time    WBC 12.3 02/09/2024 03:30 PM    HGB 15.0 02/09/2024 03:30 PM     02/09/2024 03:30 PM       BMP:    Lab Results   Component Value Date/Time     02/09/2024 03:30 PM    K 4.3 02/09/2024 03:30 PM     02/09/2024 03:30 PM    CO2 22 02/09/2024 03:30 PM    BUN 16 02/09/2024 03:30 PM    CREATININE 0.60 02/09/2024 03:30 PM    GLUCOSE 235 02/09/2024 03:30 PM       HEMOGLOBIN A1C: No results found for: \"LABA1C\"    FASTING LIPID PANEL:No results found for: \"CHOL\", \"HDL\", \"TRIG\"    PHYSICAL EXAM:  There were no vitals filed for this visit.  BP Readings from Last 3 Encounters:   10/30/24 136/84   06/01/24 (!) 146/81   02/09/24 133/80          ASSESSMENT AND PLAN:  There are no diagnoses linked to this encounter.  FOLLOW UP AND INSTRUCTIONS:  Given the patient's condition, the patient should continue with the current care provided by the pharmacist.  Follow up with in 6 months.

## 2024-12-03 ENCOUNTER — OFFICE VISIT (OUTPATIENT)
Dept: PRIMARY CARE CLINIC | Facility: CLINIC | Age: 54
End: 2024-12-03

## 2024-12-03 DIAGNOSIS — T78.40XA ALLERGIC REACTION, INITIAL ENCOUNTER: Primary | ICD-10-CM

## 2024-12-03 RX ORDER — METHYLPREDNISOLONE SODIUM SUCCINATE 125 MG/2ML
125 INJECTION INTRAMUSCULAR; INTRAVENOUS ONCE
Status: COMPLETED | OUTPATIENT
Start: 2024-12-03 | End: 2024-12-03

## 2024-12-03 RX ORDER — METHYLPREDNISOLONE SODIUM SUCCINATE 125 MG/2ML
125 INJECTION INTRAMUSCULAR; INTRAVENOUS ONCE
Status: DISCONTINUED | OUTPATIENT
Start: 2024-12-03 | End: 2024-12-03

## 2024-12-03 RX ADMIN — METHYLPREDNISOLONE SODIUM SUCCINATE 125 MG: 125 INJECTION INTRAMUSCULAR; INTRAVENOUS at 15:50

## 2024-12-03 NOTE — PROGRESS NOTES
[id number]  No chief complaint on file.    HPI:  Aida Sanchez is a 54 y.o. female.    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  She has had change in voice, but no shortness of breath so far.  She has reported similar symptoms in the past to spray paint.    Past Medical History:   Diagnosis Date    Arthritis     Depression     Diabetes (HCC)     Hypertension     Rheumatoid arthritis(714.0) 3/3/2015     Current Outpatient Medications on File Prior to Visit   Medication Sig Dispense Refill    Chlorhexidine Gluconate 2 % SOLN Soak the affected finger in this solution 3 times per day for 10-15 minutes at a time for 1 week. 473 mL 1    Etanercept 50 MG/ML SOAJ Inject 50 mg (1 auto-injector) under the skin weekly 12 mL 3    EPINEPHrine (EPIPEN) 0.3 MG/0.3ML SOAJ injection       MYRBETRIQ 50 MG TB24 Take 50 mg by mouth daily      ondansetron (ZOFRAN-ODT) 8 MG TBDP disintegrating tablet Place 1 tablet under the tongue every 8 hours as needed for Nausea or Vomiting      Multiple Vitamins-Minerals (MULTIVITAL PO) Take by mouth daily      JARDIANCE 25 MG tablet Take 1 tablet by mouth once daily      losartan (COZAAR) 50 MG tablet Take 1 tablet by mouth once daily      escitalopram (LEXAPRO) 20 MG tablet Take 1 tablet by mouth once daily      buPROPion (WELLBUTRIN XL) 300 MG extended release tablet Take 1 tablet by mouth every morning      vitamin D (ERGOCALCIFEROL) 1.25 MG (41108 UT) CAPS capsule Take 1 capsule by mouth Twice a Week      levocetirizine (XYZAL) 5 MG tablet Take 1 tablet by mouth nightly      folic acid (FOLVITE) 1 MG tablet TAKE 2 TABLETS DAILY ON TUESDAYS THROUGH FRIDAYS AND 3 TABS DAILY ON SATURDAYS THROUGH MONDAYS      hydroxychloroquine (PLAQUENIL) 200 MG tablet TAKE 1 TABLET BY MOUTH TWICE A DAY      metFORMIN (GLUCOPHAGE) 500 MG tablet TAKE 2 TABLETS BY MOUTH TWICE A DAY WITH MEALS

## 2024-12-18 ENCOUNTER — TELEPHONE (OUTPATIENT)
Facility: HOSPITAL | Age: 54
End: 2024-12-18

## 2024-12-18 NOTE — TELEPHONE ENCOUNTER
Specialty Medication Service    Date: 12/18/2024  Patient's Name: Aida Sanchez YOB: 1970            _____________________________________________________________________________________________    Spoke with patient regarding pending Quantiferon test. States has forgotten to obtain. Patient states will complete. Will follow-up accordingly.     Giusepep SchultzD [facility]  Ambulatory Clinical Pharmacist  Specialty Medication Services  Phone: [phone] option #4  Fax: [phone]    For Pharmacy Admin Tracking Only    Program: [employer]  CPA in place:  Yes  Recommendation Provided To: Patient/Caregiver: 1 via Telephone  Intervention Detail: Referral to Other Provider  Intervention Accepted By: Patient/Caregiver: 1    Time Spent (min): 10

## 2024-12-20 DIAGNOSIS — Z79.60 LONG-TERM USE OF IMMUNOSUPPRESSANT MEDICATION: Primary | ICD-10-CM

## 2024-12-27 LAB
GAMMA INTERFERON BACKGROUND BLD IA-ACNC: 0.22 IU/ML
M TB IFN-G BLD-IMP: POSITIVE
M TB IFN-G CD4+ BCKGRND COR BLD-ACNC: 0.69 IU/ML
M TB IFN-G CD4+CD8+ BCKGRND COR BLD-ACNC: 0.37 IU/ML
MITOGEN IGNF BCKGRD COR BLD-ACNC: >10 IU/ML
QUANTIFERON, INCUBATION: ABNORMAL
SERVICE CMNT-IMP: NORMAL

## 2024-12-30 ENCOUNTER — TELEPHONE (OUTPATIENT)
Facility: HOSPITAL | Age: 54
End: 2024-12-30

## 2024-12-30 DIAGNOSIS — R76.12 POSITIVE QUANTIFERON-TB GOLD TEST: Primary | ICD-10-CM

## 2024-12-30 NOTE — TELEPHONE ENCOUNTER
Specialty Medication Service    Date: 12/30/2024  Patient's Name: Aida Sanchez YOB: 1970            _____________________________________________________________________________________________    Erroneous encounter

## 2025-01-01 LAB
GAMMA INTERFERON BACKGROUND BLD IA-ACNC: 0.01 IU/ML
M TB IFN-G BLD-IMP: NEGATIVE
M TB IFN-G CD4+ BCKGRND COR BLD-ACNC: 0 IU/ML
M TB IFN-G CD4+CD8+ BCKGRND COR BLD-ACNC: 0 IU/ML
MITOGEN IGNF BCKGRD COR BLD-ACNC: >10 IU/ML
QUANTIFERON, INCUBATION: NORMAL
SERVICE CMNT-IMP: NORMAL

## 2025-01-08 ENCOUNTER — TELEPHONE (OUTPATIENT)
Facility: HOSPITAL | Age: 55
End: 2025-01-08

## 2025-01-08 NOTE — TELEPHONE ENCOUNTER
Specialty Medication Service    Date: 1/8/2025  Patient's Name: Aida Sanchez YOB: 1970            _____________________________________________________________________________________________    Patient with previous positive quantiferon gold 12/20/24. Followed up with provider per SMS recommendation. Provider repeated quantiferon 12/30/24 and was negative. Provider feels likely false positive. Followed with chest X-ray that was also normal on 12/30/24. Spoke with patient and she is aware of results. States has been in contact with provider and at this time they have decided no further action since likely a false positive. Patient has continued on her Enbrel at this time. No further action needed at this time    James Humphrye, PharmD [facility]  Ambulatory Clinical Pharmacist  Specialty Medication Services  Phone: [phone] option #4  Fax: [phone]    For Pharmacy Admin Tracking Only    Program: [facility]  CPA in place:  No  Time Spent (min): 20

## 2025-01-13 DIAGNOSIS — T78.40XA ALLERGIC REACTION, INITIAL ENCOUNTER: Primary | ICD-10-CM

## 2025-01-13 PROCEDURE — 96372 THER/PROPH/DIAG INJ SC/IM: CPT | Performed by: FAMILY MEDICINE

## 2025-01-13 RX ORDER — TRIAMCINOLONE ACETONIDE 40 MG/ML
40 INJECTION, SUSPENSION INTRA-ARTICULAR; INTRAMUSCULAR ONCE
Status: COMPLETED | OUTPATIENT
Start: 2025-01-13 | End: 2025-01-13

## 2025-01-13 RX ADMIN — TRIAMCINOLONE ACETONIDE 40 MG: 40 INJECTION, SUSPENSION INTRA-ARTICULAR; INTRAMUSCULAR at 15:40

## 2025-01-13 NOTE — PROGRESS NOTES
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

## 2025-02-10 ENCOUNTER — TELEPHONE (OUTPATIENT)
Dept: PRIMARY CARE CLINIC | Facility: CLINIC | Age: 55
End: 2025-02-10

## 2025-02-10 NOTE — TELEPHONE ENCOUNTER
Pt requested records be printed for xray and labs done by dr olson. Pt filled out release form and info was given

## 2025-03-04 ENCOUNTER — TELEPHONE (OUTPATIENT)
Facility: HOSPITAL | Age: 55
End: 2025-03-04

## 2025-03-04 NOTE — TELEPHONE ENCOUNTER
Specialty Medication Service    Date: 3/4/2025  Patient's Name: Aida Sanchez YOB: 1970            _____________________________________________________________________________________________    Provided office Cover My Meds Key: [id number] for patient medication Enbrel. PA is due to  25. Will follow-up accordingly and update pharmacy/patient once approved/denied.   Could not complete for SMS since patient has yet to see Hannah Starkey.     2025: received fax back from office. Patient is no longer being seen by Dr. Baum. Left message for patient to find out who is now seeing.    James Humphrey, PharmD [facility]  Ambulatory Clinical Pharmacist  Specialty Medication Services  Phone: [phone] option #4  Fax: [phone]    For Pharmacy Admin Tracking Only    Program: SMS  CPA in place:  No  Recommendation Provided To: Provider: 1 via Fax sent to office  Intervention Detail: Benefit Assistance  Intervention Accepted By: Provider: 0    Time Spent (min): 30

## 2025-03-14 NOTE — PROGRESS NOTES
nausea, depression, hypertension, rhinitis, vitamin D deficiency, carpal tunnel syndrome.   Current therapy includes: Enbrel 50 mg weekly + MTX weekly + hydroxychloroquine 200 mg twice daily for now. Naproxen twice daily   Medication Effectiveness: Patient disease is  well controlled on current therapy. Denies flares or exacerbations. Reports has doing very well this winter. Happy with medication regimen.   Patient had no questions regarding the medication's warnings, precautions, and contraindications. Confirmed appropriate storage and disposal.  Patient had no concerns with the administration process.  Current disease state symptoms include: None, denies joint pain, swelling, stiffness. States fatigue is mild and work related (working 7 days per week).   No side effects/adverse events reported, and no adherence issues identified.  Functional and cognitive limitations include: None per patient  Patient is not considered high risk. Based on patient feedback/results of the assessment, the therapy is  still appropriate.   No medication-related problem(s) or patient need(s) identified that would require a care plan. Follow up in 180 days     Immunizations  Immunization status: up to date and documented, missing doses of Covid 2024/25. Discussed today and patient agreeable to obtain, no questions.    Drug Interactions  No new clinically significant interactions identified via Lexicomp Interaction Analysis as category D or higher.   No changes in previously discussed drug interactions.     Other Identified Potential Issues  Patient has completed [employer] consent form. No questions in regard to consent form.  Patient provided verbal consent for [facility] patient consent form at previous visit on 11/04/2024.   New medical director visit: Scheduled 03/17/25  Specialist visit: Due to see specialist now, however needs to get insurance waiver. States this is her fault and she will take care of is ASAP. Understands she is required to see

## 2025-03-17 ENCOUNTER — PHARMACY VISIT (OUTPATIENT)
Facility: HOSPITAL | Age: 55
End: 2025-03-17

## 2025-03-17 ENCOUNTER — TELEPHONE (OUTPATIENT)
Facility: HOSPITAL | Age: 55
End: 2025-03-17

## 2025-03-17 DIAGNOSIS — M05.79 SEROPOSITIVE RHEUMATOID ARTHRITIS OF MULTIPLE SITES (HCC): Primary | ICD-10-CM

## 2025-03-17 RX ORDER — LISDEXAMFETAMINE DIMESYLATE 40 MG/1
CAPSULE ORAL
COMMUNITY
Start: 2025-03-03

## 2025-03-17 ASSESSMENT — PROMIS GLOBAL HEALTH SCALE
IN GENERAL, PLEASE RATE HOW WELL YOU CARRY OUT YOUR USUAL SOCIAL ACTIVITIES (INCLUDES ACTIVITIES AT HOME, AT WORK, AND IN YOUR COMMUNITY, AND RESPONSIBILITIES AS A PARENT, CHILD, SPOUSE, EMPLOYEE, FRIEND, ETC) [ON A SCALE OF 1 (POOR) TO 5 (EXCELLENT)]?: GOOD
SUM OF RESPONSES TO QUESTIONS 2, 4, 5, & 10: 15
IN THE PAST 7 DAYS, HOW WOULD YOU RATE YOUR FATIGUE ON AVERAGE [ON A SCALE FROM 1 (NONE) TO 5 (VERY SEVERE)]?: MILD
TO WHAT EXTENT ARE YOU ABLE TO CARRY OUT YOUR EVERYDAY PHYSICAL ACTIVITIES SUCH AS WALKING, CLIMBING STAIRS, CARRYING GROCERIES, OR MOVING A CHAIR [ON A SCALE OF 1 (NOT AT ALL) TO 5 (COMPLETELY)]?: COMPLETELY
IN GENERAL, WOULD YOU SAY YOUR HEALTH IS...[ON A SCALE OF 1 (POOR) TO 5 (EXCELLENT)]: GOOD
SUM OF RESPONSES TO QUESTIONS 3, 6, 7, & 8: 13
IN GENERAL, HOW WOULD YOU RATE YOUR PHYSICAL HEALTH [ON A SCALE OF 1 (POOR) TO 5 (EXCELLENT)]?: GOOD
IN GENERAL, HOW WOULD YOU RATE YOUR SATISFACTION WITH YOUR SOCIAL ACTIVITIES AND RELATIONSHIPS [ON A SCALE OF 1 (POOR) TO 5 (EXCELLENT)]?: GOOD
IN THE PAST 7 DAYS, HOW OFTEN HAVE YOU BEEN BOTHERED BY EMOTIONAL PROBLEMS, SUCH AS FEELING ANXIOUS, DEPRESSED, OR IRRITABLE [ON A SCALE FROM 1 (NEVER) TO 5 (ALWAYS)]?: NEVER
IN GENERAL, HOW WOULD YOU RATE YOUR MENTAL HEALTH, INCLUDING YOUR MOOD AND YOUR ABILITY TO THINK [ON A SCALE OF 1 (POOR) TO 5 (EXCELLENT)]?: GOOD
IN GENERAL, WOULD YOU SAY YOUR QUALITY OF LIFE IS...[ON A SCALE OF 1 (POOR) TO 5 (EXCELLENT)]: VERY GOOD
IN THE PAST 7 DAYS, HOW WOULD YOU RATE YOUR PAIN ON AVERAGE [ON A SCALE FROM 0 (NO PAIN) TO 10 (WORST IMAGINABLE PAIN)]?: 1

## 2025-03-17 ASSESSMENT — PATIENT HEALTH QUESTIONNAIRE - PHQ9
SUM OF ALL RESPONSES TO PHQ QUESTIONS 1-9: 0
SUM OF ALL RESPONSES TO PHQ QUESTIONS 1-9: 0
1. LITTLE INTEREST OR PLEASURE IN DOING THINGS: NOT AT ALL
SUM OF ALL RESPONSES TO PHQ QUESTIONS 1-9: 0
2. FEELING DOWN, DEPRESSED OR HOPELESS: NOT AT ALL
SUM OF ALL RESPONSES TO PHQ QUESTIONS 1-9: 0

## 2025-03-17 NOTE — PROGRESS NOTES
Initial Specialty Medication Virtual Visit  [facility] MEDICATION MANAGEMENT  [address] [facility] [address]  Dept: [phone]  Dept Fax: [phone]  Loc: [phone]  Date of patient's visit: 3/17/2025  Patient's Name:  Aida Sanchez YOB: 1970            Patient Care Team:  Jolie Iglesias MD as PCP - General (Family Medicine)  Jolie Iglesias MD as PCP - Empaneled Provider  ================================================================    REASON FOR VISIT/CHIEF COMPLAINT:  No chief complaint on file.    HISTORY OF PRESENTING ILLNESS:  Aida Sanchez is a 53 y.o. female presenting today for Specialty Medication Service visit follow up. Patient last seen by [facility] today 3/17/2025. Patient continues on [facility] formulary medication, Enbrel. Pharmacy completed Specialty Medication Service visit for medication monitoring and counseling. Medication list updated.     Specialty Medication: Enbrel 50 mg/ml auto-injector   Frequency: every 7 days  Indication: Seropositive RA multiple site, w/out organ system involvement  Initially Diagnosed: 2015  Additional Therapy:   Naproxen twice daily  Previous Therapy:   MTX + Hydroxycholoroquine     Specialist:   Nitish Baum MD  [address] [place] [address]  Phone: [phone]    Aida Sanchez has no new complaints today. She denies joint pain and swelling. She states she hasn't had a flare in at least 8 months and even her OA feels good. She states she is now addressing her CTS. She reports she's had recent lab work.       DIAGNOSTIC FINDINGS:  CBC:  Lab Results   Component Value Date/Time    WBC 12.3 02/09/2024 03:30 PM    HGB 15.0 02/09/2024 03:30 PM     02/09/2024 03:30 PM       BMP:    Lab Results   Component Value Date/Time     02/09/2024 03:30 PM    K 4.3 02/09/2024 03:30 PM     02/09/2024 03:30 PM    CO2 22 02/09/2024 03:30 PM    BUN 16

## 2025-03-17 NOTE — TELEPHONE ENCOUNTER
Specialty Medication Service    Date: 3/17/2025  Patient's Name: Aida Sanchez YOB: 1970            _____________________________________________________________________________________________    Called patient's specialist office to request most recent office visit summary and relevant labs for Specialty Medication Service formulary medication.  Faxed PCP  to have faxed to [phone].     Giuseppe SchultzD [facility]  Ambulatory Clinical Pharmacist  Specialty Medication Services  Phone: [phone] option #4  Fax: [phone]    For Pharmacy Admin Tracking Only    Program: SMS  CPA in place:  No  Recommendation Provided To: Provider: 1 via Fax sent to office    Intervention Accepted By: Provider: 0    Time Spent (min): 10

## 2025-03-19 NOTE — TELEPHONE ENCOUNTER
Specialty Medication Service    Date: 3/19/2025  Patient's Name: Aida Sanchez YOB: 1970            _____________________________________________________________________________________________    Labs from PCP added to      Coleen Pettit CPhT  Clinical    Specialty Medication Services  Phone: [phone] option #4  Fax: [phone]      For Pharmacy Admin Tracking Only    Program: [facility]  CPA in place:  Yes  Recommendation Provided To: Provider: 1 via Fax sent to office  Intervention Detail:   Intervention Accepted By: Provider: 1  Gap Closed?:    Time Spent (min): 10

## 2025-04-17 ENCOUNTER — TELEPHONE (OUTPATIENT)
Facility: HOSPITAL | Age: 55
End: 2025-04-17

## 2025-04-17 DIAGNOSIS — M05.79 SEROPOSITIVE RHEUMATOID ARTHRITIS OF MULTIPLE SITES (HCC): Primary | ICD-10-CM

## 2025-04-17 NOTE — TELEPHONE ENCOUNTER
Specialty Medication Service    Date: 4/17/2025  Patient's Name: Aida Sanchez YOB: 1970            _____________________________________________________________________________________________    Followed up with patient today regarding finding a new specialist. States still in the process. Strongly encouraged patient to do this ASAP. Enbrel RX expires next month. Patient asked if we can provide for SMS. Told patient I will discuss with medical director regarding getting two fills, however likely nothing more than this until see's a provider. Labs are up to date and patient is up-to-date with SMS. Will reach out to MILAGROS Gaxiola.     James Humphrey, GiuseppeD [facility]  Ambulatory Clinical Pharmacist  Specialty Medication Services  Phone: [phone] option #4  Fax: [phone]

## 2025-04-18 NOTE — TELEPHONE ENCOUNTER
Specialty Medication Service    Date: 4/18/2025  Patient's Name: Aida Sanchez YOB: 1970            _____________________________________________________________________________________________     Aida Sanchez is a 54 y.o.  female enrolled in the Specialty Medication Service program. Refill request received for Enbrel.    Patient does have active CPA on file.   Patient last SMS pharmacist visit was within the last 6 months.  Patient last medical director visit was within the last year.  Patient has not seen their specialist within the last year. In process of finding new rheumatologist. MILAGROS Gaxiola, has approved a 2 month SMS script in the meantime since compliant with SMS visits and labs are up to date.   Labs were reviewed.   SMS medical director referral is on file: yes  Next SMS visit is anticipated for 09/2025.   Correct SMS department for prescribing yes    Chart reviewed. The following concerns are noted: patient needs to see new specialist. Last refills until this is completed. Will follow-up.           Orders Placed This Encounter    etanercept 50 MG/ML SOAJ     Sig: Inject 50 mg (1 auto-injector) under the skin weekly - needs to be seen by specialist for future refills     Dispense:  4 mL     Refill:  1          James Humphrey PharmD [facility]  Ambulatory Clinical Pharmacist  Specialty Medication Services  Phone: [phone] option #4  Fax: [phone]     For Pharmacy Admin Tracking Only    Program: [facility]  CPA in place:  Yes  Recommendation Provided To: Provider: 1 via Note to Provider and Patient/Caregiver: 2 via Telephone  Intervention Detail: Referral to Other Provider and Refill(s) Provided  Intervention Accepted By: Provider: 1 and Patient/Caregiver: 2    Time Spent (min): 30

## 2025-05-22 ENCOUNTER — TELEPHONE (OUTPATIENT)
Facility: HOSPITAL | Age: 55
End: 2025-05-22

## 2025-05-22 NOTE — TELEPHONE ENCOUNTER
Specialty Medication Service    Date: 5/22/2025  Patient's Name: Aida Sanchez YOB: 1970            _____________________________________________________________________________________________    Spoke with patient and states still has not tried to find a new provider. States dad is in a nursing home and hasn't had time to focus on herself. States does have a few extra Enbrel pens on hand. Understands there will be no more Rxs from SMS moving forward until see's a provider. Strongly encouraged patient utilize [facility]. Patient is agreeable. Will reach out. Will follow-up 3 weeks.     James Humphrey, GiuseppeD [facility]  Ambulatory Clinical Pharmacist  Specialty Medication Services  Phone: [phone] option #4  Fax: [phone]    For Pharmacy Admin Tracking Only    Program: [facility]  CPA in place:  Yes  Recommendation Provided To: Patient/Caregiver: 1 via Telephone  Intervention Detail: Referral to Other Provider  Intervention Accepted By: Patient/Caregiver: 1    Time Spent (min): 20

## 2025-07-02 NOTE — TELEPHONE ENCOUNTER
Follow-up with your primary care provider in the next week.  Call to schedule appointment.  Return to the emergency department if worsening symptoms or new concerning symptoms   Patient returning call to schedule her EMG.

## 2025-08-25 ENCOUNTER — TELEPHONE (OUTPATIENT)
Facility: HOSPITAL | Age: 55
End: 2025-08-25

## 2025-09-03 ENCOUNTER — TELEPHONE (OUTPATIENT)
Dept: PRIMARY CARE CLINIC | Facility: CLINIC | Age: 55
End: 2025-09-03

## (undated) DEVICE — Device

## (undated) DEVICE — SOLIDIFIER MEDC 1200ML -- CONVERT TO 356117

## (undated) DEVICE — LINE FILTER NASAL CANNULA SHORT TERM ADULT 6.5

## (undated) DEVICE — SIMPLICITY FLUFF UNDERPAD 23X36, MODERATE: Brand: SIMPLICITY

## (undated) DEVICE — BITEBLOCK ENDOSCP 60FR MAXI WHT POLYETH STURDY W/ VELC WVN

## (undated) DEVICE — SET GRAV CK VLV NEEDLESS ST 3 GANGED 4WAY STPCOCK HI FLO 10

## (undated) DEVICE — IV STRT KT 3282] LSL INDUSTRIES INC]

## (undated) DEVICE — ELECTRODE,RADIOTRANSLUCENT,FOAM,3PK: Brand: MEDLINE

## (undated) DEVICE — CONMED ELIMINATOR 3-STAGE ESOPHAGEAL PET BALLOON DILATOR, 6 MM X 8 CM, 18F: Brand: ELIMINATOR

## (undated) DEVICE — SET ADMIN 16ML TBNG L100IN 2 Y INJ SITE IV PIGGY BK DISP

## (undated) DEVICE — CATH IV AUTOGRD BC PNK 20GA 25 -- INSYTE

## (undated) DEVICE — KIT COLON W/ 1.1OZ LUB AND 2 END

## (undated) DEVICE — CANN NASAL O2 CAPNOGRAPHY AD -- FILTERLINE

## (undated) DEVICE — ADULT SPO2 SENSOR,REMANUFACTURED,REPROCESSED DEVICE FOR SINGLE USE; REPROCESSED BY COVIDIEN LLC: Brand: NELLCOR

## (undated) DEVICE — BLUNTFILL WITH FILTER: Brand: MONOJECT

## (undated) DEVICE — SYR 50ML SLIP TIP NSAF LF STRL --

## (undated) DEVICE — SET ADMIN 16ML TBNG L100IN 2 Y INJ SITE IV PIGGY BK DISP (ORDER IN MULIPLES OF 48)

## (undated) DEVICE — BAG BELONG PT PERS CLEAR HANDL

## (undated) DEVICE — 3M™ CUROS™ DISINFECTING CAP FOR NEEDLELESS CONNECTORS 270/CARTON 20 CARTONS/CASE CFF1-270: Brand: CUROS™

## (undated) DEVICE — CATHETER IV 20 GAX1 IN N WNG KINK RESIST INSYT AUTOGRD

## (undated) DEVICE — BLUNTFILL: Brand: MONOJECT

## (undated) DEVICE — CANNULA CUSH AD W/ 14FT TBG

## (undated) DEVICE — 1200 GUARD II KIT W/5MM TUBE W/O VAC TUBE: Brand: GUARDIAN

## (undated) DEVICE — SOLIDIFIER FLD 2OZ 1500CC N DISINF IN BTL DISP SAFESORB